# Patient Record
Sex: MALE | Race: WHITE | NOT HISPANIC OR LATINO | Employment: OTHER | URBAN - METROPOLITAN AREA
[De-identification: names, ages, dates, MRNs, and addresses within clinical notes are randomized per-mention and may not be internally consistent; named-entity substitution may affect disease eponyms.]

---

## 2017-01-05 ENCOUNTER — APPOINTMENT (OUTPATIENT)
Dept: LAB | Facility: HOSPITAL | Age: 82
End: 2017-01-05
Payer: MEDICARE

## 2017-01-05 DIAGNOSIS — E11.9 DIABETES MELLITUS WITH NO COMPLICATION (HCC): ICD-10-CM

## 2017-01-05 LAB
ANION GAP SERPL CALCULATED.3IONS-SCNC: 8 MMOL/L (ref 4–13)
BUN SERPL-MCNC: 26 MG/DL (ref 5–25)
CALCIUM SERPL-MCNC: 9.2 MG/DL (ref 8.3–10.1)
CHLORIDE SERPL-SCNC: 107 MMOL/L (ref 100–108)
CO2 SERPL-SCNC: 28 MMOL/L (ref 21–32)
CREAT SERPL-MCNC: 1.31 MG/DL (ref 0.6–1.3)
GFR SERPL CREATININE-BSD FRML MDRD: 52.4 ML/MIN/1.73SQ M
GLUCOSE SERPL-MCNC: 156 MG/DL (ref 65–140)
POTASSIUM SERPL-SCNC: 4 MMOL/L (ref 3.5–5.3)
SODIUM SERPL-SCNC: 143 MMOL/L (ref 136–145)

## 2017-01-05 PROCEDURE — 36415 COLL VENOUS BLD VENIPUNCTURE: CPT

## 2017-01-05 PROCEDURE — 80048 BASIC METABOLIC PNL TOTAL CA: CPT

## 2017-05-19 ENCOUNTER — LAB REQUISITION (OUTPATIENT)
Dept: LAB | Facility: HOSPITAL | Age: 82
End: 2017-05-19
Payer: MEDICARE

## 2017-05-19 DIAGNOSIS — E11.9 TYPE 2 DIABETES MELLITUS WITHOUT COMPLICATIONS (HCC): ICD-10-CM

## 2017-05-19 DIAGNOSIS — R53.83 OTHER FATIGUE: ICD-10-CM

## 2017-05-19 DIAGNOSIS — L50.9 URTICARIA: ICD-10-CM

## 2017-05-19 LAB
ALBUMIN SERPL BCP-MCNC: 3.7 G/DL (ref 3.5–5)
ALP SERPL-CCNC: 80 U/L (ref 46–116)
ALT SERPL W P-5'-P-CCNC: 19 U/L (ref 12–78)
ANION GAP SERPL CALCULATED.3IONS-SCNC: 10 MMOL/L (ref 4–13)
AST SERPL W P-5'-P-CCNC: 12 U/L (ref 5–45)
BILIRUB SERPL-MCNC: 0.54 MG/DL (ref 0.2–1)
BUN SERPL-MCNC: 31 MG/DL (ref 5–25)
CALCIUM SERPL-MCNC: 8.9 MG/DL (ref 8.3–10.1)
CHLORIDE SERPL-SCNC: 103 MMOL/L (ref 100–108)
CO2 SERPL-SCNC: 27 MMOL/L (ref 21–32)
CREAT SERPL-MCNC: 1.43 MG/DL (ref 0.6–1.3)
ERYTHROCYTE [DISTWIDTH] IN BLOOD BY AUTOMATED COUNT: 12.1 % (ref 11.6–15.1)
EST. AVERAGE GLUCOSE BLD GHB EST-MCNC: 203 MG/DL
GFR SERPL CREATININE-BSD FRML MDRD: 47.2 ML/MIN/1.73SQ M
GLUCOSE P FAST SERPL-MCNC: 303 MG/DL (ref 65–99)
HBA1C MFR BLD: 8.7 % (ref 4.2–6.3)
HCT VFR BLD AUTO: 38.3 % (ref 36.5–49.3)
HGB BLD-MCNC: 13 G/DL (ref 12–17)
MCH RBC QN AUTO: 29.9 PG (ref 26.8–34.3)
MCHC RBC AUTO-ENTMCNC: 33.9 G/DL (ref 31.4–37.4)
MCV RBC AUTO: 88 FL (ref 82–98)
PLATELET # BLD AUTO: 142 THOUSANDS/UL (ref 149–390)
PMV BLD AUTO: 10.6 FL (ref 8.9–12.7)
POTASSIUM SERPL-SCNC: 4.9 MMOL/L (ref 3.5–5.3)
PROT SERPL-MCNC: 6.9 G/DL (ref 6.4–8.2)
RBC # BLD AUTO: 4.35 MILLION/UL (ref 3.88–5.62)
SODIUM SERPL-SCNC: 140 MMOL/L (ref 136–145)
TSH SERPL DL<=0.05 MIU/L-ACNC: 1.86 UIU/ML (ref 0.36–3.74)
WBC # BLD AUTO: 4.58 THOUSAND/UL (ref 4.31–10.16)

## 2017-05-19 PROCEDURE — 84443 ASSAY THYROID STIM HORMONE: CPT | Performed by: INTERNAL MEDICINE

## 2017-05-19 PROCEDURE — 80053 COMPREHEN METABOLIC PANEL: CPT | Performed by: INTERNAL MEDICINE

## 2017-05-19 PROCEDURE — 83036 HEMOGLOBIN GLYCOSYLATED A1C: CPT | Performed by: INTERNAL MEDICINE

## 2017-05-19 PROCEDURE — 85027 COMPLETE CBC AUTOMATED: CPT | Performed by: INTERNAL MEDICINE

## 2017-07-17 ENCOUNTER — TRANSCRIBE ORDERS (OUTPATIENT)
Dept: LAB | Facility: CLINIC | Age: 82
End: 2017-07-17

## 2017-07-17 ENCOUNTER — APPOINTMENT (OUTPATIENT)
Dept: LAB | Facility: CLINIC | Age: 82
End: 2017-07-17
Payer: MEDICARE

## 2017-07-17 DIAGNOSIS — Z00.00 ROUTINE GENERAL MEDICAL EXAMINATION AT A HEALTH CARE FACILITY: ICD-10-CM

## 2017-07-17 DIAGNOSIS — Z00.00 ROUTINE GENERAL MEDICAL EXAMINATION AT A HEALTH CARE FACILITY: Primary | ICD-10-CM

## 2017-07-17 LAB
ANION GAP SERPL CALCULATED.3IONS-SCNC: 7 MMOL/L (ref 4–13)
BUN SERPL-MCNC: 22 MG/DL (ref 5–25)
CALCIUM SERPL-MCNC: 8.9 MG/DL (ref 8.3–10.1)
CHLORIDE SERPL-SCNC: 100 MMOL/L (ref 100–108)
CO2 SERPL-SCNC: 29 MMOL/L (ref 21–32)
CREAT SERPL-MCNC: 1.35 MG/DL (ref 0.6–1.3)
GFR SERPL CREATININE-BSD FRML MDRD: 50.5 ML/MIN/1.73SQ M
GLUCOSE P FAST SERPL-MCNC: 375 MG/DL (ref 65–99)
POTASSIUM SERPL-SCNC: 5 MMOL/L (ref 3.5–5.3)
SODIUM SERPL-SCNC: 136 MMOL/L (ref 136–145)

## 2017-07-17 PROCEDURE — 36415 COLL VENOUS BLD VENIPUNCTURE: CPT

## 2017-07-17 PROCEDURE — 80048 BASIC METABOLIC PNL TOTAL CA: CPT

## 2017-07-18 ENCOUNTER — LAB REQUISITION (OUTPATIENT)
Dept: LAB | Facility: HOSPITAL | Age: 82
End: 2017-07-18
Payer: MEDICARE

## 2017-07-18 DIAGNOSIS — E11.65 TYPE 2 DIABETES MELLITUS WITH HYPERGLYCEMIA (HCC): ICD-10-CM

## 2017-07-18 LAB — GLUCOSE P FAST SERPL-MCNC: 214 MG/DL (ref 65–99)

## 2017-07-18 PROCEDURE — 82947 ASSAY GLUCOSE BLOOD QUANT: CPT | Performed by: INTERNAL MEDICINE

## 2017-08-21 ENCOUNTER — APPOINTMENT (OUTPATIENT)
Dept: LAB | Facility: HOSPITAL | Age: 82
End: 2017-08-21
Payer: MEDICARE

## 2017-08-21 ENCOUNTER — TRANSCRIBE ORDERS (OUTPATIENT)
Dept: LAB | Facility: HOSPITAL | Age: 82
End: 2017-08-21

## 2017-08-21 DIAGNOSIS — IMO0001 UNCONTROLLED DIABETES MELLITUS TYPE 2 WITHOUT COMPLICATIONS, UNSPECIFIED LONG TERM INSULIN USE STATUS: ICD-10-CM

## 2017-08-21 DIAGNOSIS — IMO0001 UNCONTROLLED DIABETES MELLITUS TYPE 2 WITHOUT COMPLICATIONS, UNSPECIFIED LONG TERM INSULIN USE STATUS: Primary | ICD-10-CM

## 2017-08-21 LAB
ANION GAP SERPL CALCULATED.3IONS-SCNC: 6 MMOL/L (ref 4–13)
BUN SERPL-MCNC: 23 MG/DL (ref 5–25)
CALCIUM SERPL-MCNC: 8.8 MG/DL (ref 8.3–10.1)
CHLORIDE SERPL-SCNC: 100 MMOL/L (ref 100–108)
CO2 SERPL-SCNC: 31 MMOL/L (ref 21–32)
CREAT SERPL-MCNC: 1.37 MG/DL (ref 0.6–1.3)
GFR SERPL CREATININE-BSD FRML MDRD: 47 ML/MIN/1.73SQ M
GLUCOSE SERPL-MCNC: 398 MG/DL (ref 65–140)
POTASSIUM SERPL-SCNC: 5.2 MMOL/L (ref 3.5–5.3)
SODIUM SERPL-SCNC: 137 MMOL/L (ref 136–145)

## 2017-08-21 PROCEDURE — 36415 COLL VENOUS BLD VENIPUNCTURE: CPT

## 2017-08-21 PROCEDURE — 80048 BASIC METABOLIC PNL TOTAL CA: CPT

## 2017-09-18 ENCOUNTER — APPOINTMENT (OUTPATIENT)
Dept: LAB | Facility: CLINIC | Age: 82
End: 2017-09-18
Payer: MEDICARE

## 2017-09-18 ENCOUNTER — TRANSCRIBE ORDERS (OUTPATIENT)
Dept: LAB | Facility: CLINIC | Age: 82
End: 2017-09-18

## 2017-09-18 DIAGNOSIS — E11.8 UNCONTROLLED TYPE 2 DIABETES MELLITUS WITH COMPLICATION, UNSPECIFIED LONG TERM INSULIN USE STATUS: ICD-10-CM

## 2017-09-18 DIAGNOSIS — E13.8 DIABETES MELLITUS OF OTHER TYPE WITH COMPLICATION, UNSPECIFIED LONG TERM INSULIN USE STATUS: ICD-10-CM

## 2017-09-18 DIAGNOSIS — I10 ESSENTIAL HYPERTENSION, MALIGNANT: ICD-10-CM

## 2017-09-18 DIAGNOSIS — I10 ESSENTIAL HYPERTENSION, MALIGNANT: Primary | ICD-10-CM

## 2017-09-18 DIAGNOSIS — E11.65 UNCONTROLLED TYPE 2 DIABETES MELLITUS WITH COMPLICATION, UNSPECIFIED LONG TERM INSULIN USE STATUS: ICD-10-CM

## 2017-09-18 LAB
ANION GAP SERPL CALCULATED.3IONS-SCNC: 7 MMOL/L (ref 4–13)
BUN SERPL-MCNC: 24 MG/DL (ref 5–25)
CALCIUM SERPL-MCNC: 9.2 MG/DL (ref 8.3–10.1)
CHLORIDE SERPL-SCNC: 105 MMOL/L (ref 100–108)
CO2 SERPL-SCNC: 29 MMOL/L (ref 21–32)
CREAT SERPL-MCNC: 1.37 MG/DL (ref 0.6–1.3)
GFR SERPL CREATININE-BSD FRML MDRD: 47 ML/MIN/1.73SQ M
GLUCOSE SERPL-MCNC: 214 MG/DL (ref 65–140)
POTASSIUM SERPL-SCNC: 4.4 MMOL/L (ref 3.5–5.3)
SODIUM SERPL-SCNC: 141 MMOL/L (ref 136–145)

## 2017-09-18 PROCEDURE — 80048 BASIC METABOLIC PNL TOTAL CA: CPT

## 2017-09-18 PROCEDURE — 36415 COLL VENOUS BLD VENIPUNCTURE: CPT

## 2017-10-12 ENCOUNTER — APPOINTMENT (OUTPATIENT)
Dept: LAB | Facility: CLINIC | Age: 82
End: 2017-10-12
Payer: MEDICARE

## 2017-10-12 DIAGNOSIS — E11.65 UNCONTROLLED TYPE 2 DIABETES MELLITUS WITH COMPLICATION, UNSPECIFIED LONG TERM INSULIN USE STATUS: ICD-10-CM

## 2017-10-12 DIAGNOSIS — E11.8 UNCONTROLLED TYPE 2 DIABETES MELLITUS WITH COMPLICATION, UNSPECIFIED LONG TERM INSULIN USE STATUS: ICD-10-CM

## 2017-10-12 LAB
ANION GAP SERPL CALCULATED.3IONS-SCNC: 4 MMOL/L (ref 4–13)
BUN SERPL-MCNC: 27 MG/DL (ref 5–25)
CALCIUM SERPL-MCNC: 9 MG/DL (ref 8.3–10.1)
CHLORIDE SERPL-SCNC: 101 MMOL/L (ref 100–108)
CO2 SERPL-SCNC: 31 MMOL/L (ref 21–32)
CREAT SERPL-MCNC: 1.33 MG/DL (ref 0.6–1.3)
GFR SERPL CREATININE-BSD FRML MDRD: 49 ML/MIN/1.73SQ M
GLUCOSE SERPL-MCNC: 305 MG/DL (ref 65–140)
POTASSIUM SERPL-SCNC: 4.4 MMOL/L (ref 3.5–5.3)
SODIUM SERPL-SCNC: 136 MMOL/L (ref 136–145)

## 2017-10-12 PROCEDURE — 80048 BASIC METABOLIC PNL TOTAL CA: CPT

## 2017-10-12 PROCEDURE — 36415 COLL VENOUS BLD VENIPUNCTURE: CPT

## 2017-12-11 ENCOUNTER — TRANSCRIBE ORDERS (OUTPATIENT)
Dept: LAB | Facility: CLINIC | Age: 82
End: 2017-12-11

## 2017-12-11 ENCOUNTER — APPOINTMENT (OUTPATIENT)
Dept: LAB | Facility: CLINIC | Age: 82
End: 2017-12-11
Payer: MEDICARE

## 2017-12-11 DIAGNOSIS — E11.319: Primary | ICD-10-CM

## 2017-12-11 DIAGNOSIS — E11.65: Primary | ICD-10-CM

## 2017-12-11 LAB
ANION GAP SERPL CALCULATED.3IONS-SCNC: 8 MMOL/L (ref 4–13)
BUN SERPL-MCNC: 29 MG/DL (ref 5–25)
CALCIUM SERPL-MCNC: 9.1 MG/DL (ref 8.3–10.1)
CHLORIDE SERPL-SCNC: 105 MMOL/L (ref 100–108)
CO2 SERPL-SCNC: 28 MMOL/L (ref 21–32)
CREAT SERPL-MCNC: 1.4 MG/DL (ref 0.6–1.3)
EST. AVERAGE GLUCOSE BLD GHB EST-MCNC: 252 MG/DL
GFR SERPL CREATININE-BSD FRML MDRD: 46 ML/MIN/1.73SQ M
GLUCOSE SERPL-MCNC: 138 MG/DL (ref 65–140)
HBA1C MFR BLD: 10.4 % (ref 4.2–6.3)
POTASSIUM SERPL-SCNC: 4.3 MMOL/L (ref 3.5–5.3)
SODIUM SERPL-SCNC: 141 MMOL/L (ref 136–145)

## 2017-12-11 PROCEDURE — 83036 HEMOGLOBIN GLYCOSYLATED A1C: CPT

## 2017-12-11 PROCEDURE — 80048 BASIC METABOLIC PNL TOTAL CA: CPT

## 2017-12-11 PROCEDURE — 36415 COLL VENOUS BLD VENIPUNCTURE: CPT

## 2018-04-03 ENCOUNTER — APPOINTMENT (OUTPATIENT)
Dept: LAB | Facility: CLINIC | Age: 83
End: 2018-04-03
Payer: MEDICARE

## 2018-04-03 ENCOUNTER — TRANSCRIBE ORDERS (OUTPATIENT)
Dept: LAB | Facility: CLINIC | Age: 83
End: 2018-04-03

## 2018-04-03 DIAGNOSIS — E13.8 DIABETES MELLITUS OF OTHER TYPE WITH COMPLICATION, UNSPECIFIED LONG TERM INSULIN USE STATUS: ICD-10-CM

## 2018-04-03 DIAGNOSIS — E13.8 DIABETES MELLITUS OF OTHER TYPE WITH COMPLICATION, UNSPECIFIED LONG TERM INSULIN USE STATUS: Primary | ICD-10-CM

## 2018-04-03 LAB
ANION GAP SERPL CALCULATED.3IONS-SCNC: 0 MMOL/L (ref 4–13)
BUN SERPL-MCNC: 32 MG/DL (ref 5–25)
CALCIUM SERPL-MCNC: 9.4 MG/DL (ref 8.3–10.1)
CHLORIDE SERPL-SCNC: 106 MMOL/L (ref 100–108)
CO2 SERPL-SCNC: 33 MMOL/L (ref 21–32)
CREAT SERPL-MCNC: 1.4 MG/DL (ref 0.6–1.3)
EST. AVERAGE GLUCOSE BLD GHB EST-MCNC: 229 MG/DL
GFR SERPL CREATININE-BSD FRML MDRD: 46 ML/MIN/1.73SQ M
GLUCOSE SERPL-MCNC: 254 MG/DL (ref 65–140)
HBA1C MFR BLD: 9.6 % (ref 4.2–6.3)
POTASSIUM SERPL-SCNC: 5.1 MMOL/L (ref 3.5–5.3)
SODIUM SERPL-SCNC: 139 MMOL/L (ref 136–145)

## 2018-04-03 PROCEDURE — 36415 COLL VENOUS BLD VENIPUNCTURE: CPT

## 2018-04-03 PROCEDURE — 80048 BASIC METABOLIC PNL TOTAL CA: CPT

## 2018-04-03 PROCEDURE — 83036 HEMOGLOBIN GLYCOSYLATED A1C: CPT

## 2018-08-14 ENCOUNTER — LAB REQUISITION (OUTPATIENT)
Dept: LAB | Facility: HOSPITAL | Age: 83
End: 2018-08-14
Payer: MEDICARE

## 2018-08-14 DIAGNOSIS — E11.65 TYPE 2 DIABETES MELLITUS WITH HYPERGLYCEMIA (HCC): ICD-10-CM

## 2018-08-14 LAB
ANION GAP SERPL CALCULATED.3IONS-SCNC: 6 MMOL/L (ref 4–13)
BUN SERPL-MCNC: 25 MG/DL (ref 5–25)
CALCIUM SERPL-MCNC: 8.9 MG/DL (ref 8.3–10.1)
CHLORIDE SERPL-SCNC: 101 MMOL/L (ref 100–108)
CO2 SERPL-SCNC: 29 MMOL/L (ref 21–32)
CREAT SERPL-MCNC: 1.28 MG/DL (ref 0.6–1.3)
EST. AVERAGE GLUCOSE BLD GHB EST-MCNC: 246 MG/DL
GFR SERPL CREATININE-BSD FRML MDRD: 51 ML/MIN/1.73SQ M
GLUCOSE SERPL-MCNC: 225 MG/DL (ref 65–140)
HBA1C MFR BLD: 10.2 % (ref 4.2–6.3)
POTASSIUM SERPL-SCNC: 4.4 MMOL/L (ref 3.5–5.3)
SODIUM SERPL-SCNC: 136 MMOL/L (ref 136–145)

## 2018-08-14 PROCEDURE — 83036 HEMOGLOBIN GLYCOSYLATED A1C: CPT | Performed by: INTERNAL MEDICINE

## 2018-08-14 PROCEDURE — 80048 BASIC METABOLIC PNL TOTAL CA: CPT | Performed by: INTERNAL MEDICINE

## 2019-03-04 ENCOUNTER — LAB REQUISITION (OUTPATIENT)
Dept: LAB | Facility: HOSPITAL | Age: 84
End: 2019-03-04
Payer: MEDICARE

## 2019-03-04 DIAGNOSIS — E11.65 TYPE 2 DIABETES MELLITUS WITH HYPERGLYCEMIA (HCC): ICD-10-CM

## 2019-03-04 DIAGNOSIS — E78.5 HYPERLIPIDEMIA: ICD-10-CM

## 2019-03-04 LAB
ALBUMIN SERPL BCP-MCNC: 3.8 G/DL (ref 3.5–5)
ALP SERPL-CCNC: 87 U/L (ref 46–116)
ALT SERPL W P-5'-P-CCNC: 23 U/L (ref 12–78)
ANION GAP SERPL CALCULATED.3IONS-SCNC: 7 MMOL/L (ref 4–13)
AST SERPL W P-5'-P-CCNC: 19 U/L (ref 5–45)
BILIRUB SERPL-MCNC: 0.59 MG/DL (ref 0.2–1)
BUN SERPL-MCNC: 22 MG/DL (ref 5–25)
CALCIUM SERPL-MCNC: 9 MG/DL (ref 8.3–10.1)
CHLORIDE SERPL-SCNC: 102 MMOL/L (ref 100–108)
CO2 SERPL-SCNC: 27 MMOL/L (ref 21–32)
CREAT SERPL-MCNC: 1.16 MG/DL (ref 0.6–1.3)
EST. AVERAGE GLUCOSE BLD GHB EST-MCNC: 249 MG/DL
GFR SERPL CREATININE-BSD FRML MDRD: 58 ML/MIN/1.73SQ M
GLUCOSE SERPL-MCNC: 157 MG/DL (ref 65–140)
HBA1C MFR BLD: 10.3 % (ref 4.2–6.3)
LDLC SERPL DIRECT ASSAY-MCNC: 131 MG/DL (ref 0–100)
POTASSIUM SERPL-SCNC: 4.8 MMOL/L (ref 3.5–5.3)
PROT SERPL-MCNC: 7 G/DL (ref 6.4–8.2)
SODIUM SERPL-SCNC: 136 MMOL/L (ref 136–145)

## 2019-03-04 PROCEDURE — 83721 ASSAY OF BLOOD LIPOPROTEIN: CPT | Performed by: INTERNAL MEDICINE

## 2019-03-04 PROCEDURE — 80053 COMPREHEN METABOLIC PANEL: CPT | Performed by: INTERNAL MEDICINE

## 2019-03-04 PROCEDURE — 83036 HEMOGLOBIN GLYCOSYLATED A1C: CPT | Performed by: INTERNAL MEDICINE

## 2019-04-06 ENCOUNTER — HOSPITAL ENCOUNTER (INPATIENT)
Facility: HOSPITAL | Age: 84
LOS: 3 days | Discharge: HOME HEALTH | DRG: 309 | End: 2019-04-09
Attending: EMERGENCY MEDICINE | Admitting: INTERNAL MEDICINE
Payer: MEDICARE

## 2019-04-06 ENCOUNTER — APPOINTMENT (EMERGENCY)
Dept: RADIOLOGY | Facility: HOSPITAL | Age: 84
DRG: 309 | End: 2019-04-06
Payer: MEDICARE

## 2019-04-06 DIAGNOSIS — E11.65 TYPE 2 DIABETES MELLITUS WITH HYPERGLYCEMIA, WITHOUT LONG-TERM CURRENT USE OF INSULIN (HCC): ICD-10-CM

## 2019-04-06 DIAGNOSIS — D72.829 LEUKOCYTOSIS: ICD-10-CM

## 2019-04-06 DIAGNOSIS — I48.91 ATRIAL FIBRILLATION WITH RVR (HCC): Primary | ICD-10-CM

## 2019-04-06 DIAGNOSIS — N17.9 ACUTE KIDNEY INJURY (HCC): ICD-10-CM

## 2019-04-06 DIAGNOSIS — E46 MALNUTRITION (HCC): ICD-10-CM

## 2019-04-06 PROBLEM — F41.8 DEPRESSION WITH ANXIETY: Status: ACTIVE | Noted: 2019-04-06

## 2019-04-06 LAB
ALBUMIN SERPL BCP-MCNC: 3.5 G/DL (ref 3.5–5)
ALP SERPL-CCNC: 89 U/L (ref 46–116)
ALT SERPL W P-5'-P-CCNC: 22 U/L (ref 12–78)
ANION GAP SERPL CALCULATED.3IONS-SCNC: 16 MMOL/L (ref 4–13)
APTT PPP: 30 SECONDS (ref 26–38)
AST SERPL W P-5'-P-CCNC: 15 U/L (ref 5–45)
BACTERIA UR QL AUTO: ABNORMAL /HPF
BASOPHILS # BLD AUTO: 0.05 THOUSANDS/ΜL (ref 0–0.1)
BASOPHILS NFR BLD AUTO: 0 % (ref 0–1)
BILIRUB SERPL-MCNC: 0.96 MG/DL (ref 0.2–1)
BILIRUB UR QL STRIP: NEGATIVE
BUN SERPL-MCNC: 37 MG/DL (ref 5–25)
CALCIUM SERPL-MCNC: 9.5 MG/DL (ref 8.3–10.1)
CHLORIDE SERPL-SCNC: 97 MMOL/L (ref 100–108)
CLARITY UR: CLEAR
CO2 SERPL-SCNC: 25 MMOL/L (ref 21–32)
COLOR UR: YELLOW
COLOR, POC: YELLOW
CREAT SERPL-MCNC: 1.58 MG/DL (ref 0.6–1.3)
EOSINOPHIL # BLD AUTO: 0.01 THOUSAND/ΜL (ref 0–0.61)
EOSINOPHIL NFR BLD AUTO: 0 % (ref 0–6)
ERYTHROCYTE [DISTWIDTH] IN BLOOD BY AUTOMATED COUNT: 11.9 % (ref 11.6–15.1)
GFR SERPL CREATININE-BSD FRML MDRD: 39 ML/MIN/1.73SQ M
GLUCOSE SERPL-MCNC: 334 MG/DL (ref 65–140)
GLUCOSE SERPL-MCNC: 487 MG/DL (ref 65–140)
GLUCOSE UR STRIP-MCNC: ABNORMAL MG/DL
HCT VFR BLD AUTO: 48.2 % (ref 36.5–49.3)
HGB BLD-MCNC: 15.9 G/DL (ref 12–17)
HGB UR QL STRIP.AUTO: ABNORMAL
IMM GRANULOCYTES # BLD AUTO: 0.14 THOUSAND/UL (ref 0–0.2)
IMM GRANULOCYTES NFR BLD AUTO: 1 % (ref 0–2)
INR PPP: 1 (ref 0.86–1.17)
KETONES UR STRIP-MCNC: ABNORMAL MG/DL
LACTATE SERPL-SCNC: 0.5 MMOL/L (ref 0.5–2)
LEUKOCYTE ESTERASE UR QL STRIP: NEGATIVE
LYMPHOCYTES # BLD AUTO: 1.42 THOUSANDS/ΜL (ref 0.6–4.47)
LYMPHOCYTES NFR BLD AUTO: 8 % (ref 14–44)
MAGNESIUM SERPL-MCNC: 2 MG/DL (ref 1.6–2.6)
MCH RBC QN AUTO: 30.2 PG (ref 26.8–34.3)
MCHC RBC AUTO-ENTMCNC: 33 G/DL (ref 31.4–37.4)
MCV RBC AUTO: 92 FL (ref 82–98)
MONOCYTES # BLD AUTO: 0.78 THOUSAND/ΜL (ref 0.17–1.22)
MONOCYTES NFR BLD AUTO: 4 % (ref 4–12)
NEUTROPHILS # BLD AUTO: 16.3 THOUSANDS/ΜL (ref 1.85–7.62)
NEUTS SEG NFR BLD AUTO: 87 % (ref 43–75)
NITRITE UR QL STRIP: NEGATIVE
NON-SQ EPI CELLS URNS QL MICRO: ABNORMAL /HPF
NRBC BLD AUTO-RTO: 0 /100 WBCS
PH UR STRIP.AUTO: 5 [PH] (ref 4.5–8)
PLATELET # BLD AUTO: 201 THOUSANDS/UL (ref 149–390)
PMV BLD AUTO: 10.4 FL (ref 8.9–12.7)
POTASSIUM SERPL-SCNC: 5.1 MMOL/L (ref 3.5–5.3)
PROCALCITONIN SERPL-MCNC: 0.09 NG/ML
PROT SERPL-MCNC: 7.7 G/DL (ref 6.4–8.2)
PROT UR STRIP-MCNC: NEGATIVE MG/DL
PROTHROMBIN TIME: 13.3 SECONDS (ref 11.8–14.2)
RBC # BLD AUTO: 5.27 MILLION/UL (ref 3.88–5.62)
RBC #/AREA URNS AUTO: ABNORMAL /HPF
SODIUM SERPL-SCNC: 138 MMOL/L (ref 136–145)
SP GR UR STRIP.AUTO: <=1.005 (ref 1–1.03)
TROPONIN I SERPL-MCNC: 0.04 NG/ML
TSH SERPL DL<=0.05 MIU/L-ACNC: 2.85 UIU/ML (ref 0.36–3.74)
UROBILINOGEN UR QL STRIP.AUTO: 0.2 E.U./DL
WBC # BLD AUTO: 18.7 THOUSAND/UL (ref 4.31–10.16)
WBC #/AREA URNS AUTO: ABNORMAL /HPF

## 2019-04-06 PROCEDURE — 83735 ASSAY OF MAGNESIUM: CPT | Performed by: EMERGENCY MEDICINE

## 2019-04-06 PROCEDURE — 85730 THROMBOPLASTIN TIME PARTIAL: CPT | Performed by: EMERGENCY MEDICINE

## 2019-04-06 PROCEDURE — 93005 ELECTROCARDIOGRAM TRACING: CPT

## 2019-04-06 PROCEDURE — 71046 X-RAY EXAM CHEST 2 VIEWS: CPT

## 2019-04-06 PROCEDURE — 84484 ASSAY OF TROPONIN QUANT: CPT | Performed by: EMERGENCY MEDICINE

## 2019-04-06 PROCEDURE — 99223 1ST HOSP IP/OBS HIGH 75: CPT | Performed by: PHYSICIAN ASSISTANT

## 2019-04-06 PROCEDURE — 84443 ASSAY THYROID STIM HORMONE: CPT | Performed by: EMERGENCY MEDICINE

## 2019-04-06 PROCEDURE — 96365 THER/PROPH/DIAG IV INF INIT: CPT

## 2019-04-06 PROCEDURE — 96376 TX/PRO/DX INJ SAME DRUG ADON: CPT

## 2019-04-06 PROCEDURE — 99285 EMERGENCY DEPT VISIT HI MDM: CPT

## 2019-04-06 PROCEDURE — 81001 URINALYSIS AUTO W/SCOPE: CPT

## 2019-04-06 PROCEDURE — 80053 COMPREHEN METABOLIC PANEL: CPT | Performed by: EMERGENCY MEDICINE

## 2019-04-06 PROCEDURE — 99285 EMERGENCY DEPT VISIT HI MDM: CPT | Performed by: EMERGENCY MEDICINE

## 2019-04-06 PROCEDURE — 87040 BLOOD CULTURE FOR BACTERIA: CPT | Performed by: EMERGENCY MEDICINE

## 2019-04-06 PROCEDURE — 36415 COLL VENOUS BLD VENIPUNCTURE: CPT | Performed by: EMERGENCY MEDICINE

## 2019-04-06 PROCEDURE — 84145 PROCALCITONIN (PCT): CPT | Performed by: EMERGENCY MEDICINE

## 2019-04-06 PROCEDURE — 83605 ASSAY OF LACTIC ACID: CPT | Performed by: EMERGENCY MEDICINE

## 2019-04-06 PROCEDURE — 82948 REAGENT STRIP/BLOOD GLUCOSE: CPT

## 2019-04-06 PROCEDURE — 85610 PROTHROMBIN TIME: CPT | Performed by: EMERGENCY MEDICINE

## 2019-04-06 PROCEDURE — 85025 COMPLETE CBC W/AUTO DIFF WBC: CPT | Performed by: EMERGENCY MEDICINE

## 2019-04-06 RX ORDER — GLIMEPIRIDE 4 MG/1
4 TABLET ORAL
COMMUNITY
End: 2020-09-08 | Stop reason: HOSPADM

## 2019-04-06 RX ORDER — DILTIAZEM HYDROCHLORIDE 5 MG/ML
INJECTION INTRAVENOUS
Status: DISCONTINUED
Start: 2019-04-06 | End: 2019-04-06 | Stop reason: WASHOUT

## 2019-04-06 RX ORDER — ONDANSETRON 2 MG/ML
4 INJECTION INTRAMUSCULAR; INTRAVENOUS EVERY 6 HOURS PRN
Status: DISCONTINUED | OUTPATIENT
Start: 2019-04-06 | End: 2019-04-07

## 2019-04-06 RX ORDER — GLYBURIDE-METFORMIN HYDROCHLORIDE 5; 500 MG/1; MG/1
TABLET ORAL
COMMUNITY
End: 2019-04-06

## 2019-04-06 RX ORDER — ACETAMINOPHEN 325 MG/1
650 TABLET ORAL EVERY 6 HOURS PRN
Status: DISCONTINUED | OUTPATIENT
Start: 2019-04-06 | End: 2019-04-09 | Stop reason: HOSPADM

## 2019-04-06 RX ORDER — DILTIAZEM HYDROCHLORIDE 5 MG/ML
10 INJECTION INTRAVENOUS ONCE
Status: COMPLETED | OUTPATIENT
Start: 2019-04-06 | End: 2019-04-06

## 2019-04-06 RX ORDER — HEPARIN SODIUM 5000 [USP'U]/ML
5000 INJECTION, SOLUTION INTRAVENOUS; SUBCUTANEOUS EVERY 8 HOURS SCHEDULED
Status: DISCONTINUED | OUTPATIENT
Start: 2019-04-06 | End: 2019-04-07

## 2019-04-06 RX ORDER — DILTIAZEM HYDROCHLORIDE 5 MG/ML
15 INJECTION INTRAVENOUS ONCE
Status: DISCONTINUED | OUTPATIENT
Start: 2019-04-06 | End: 2019-04-06

## 2019-04-06 RX ORDER — SODIUM CHLORIDE 9 MG/ML
100 INJECTION, SOLUTION INTRAVENOUS CONTINUOUS
Status: DISCONTINUED | OUTPATIENT
Start: 2019-04-06 | End: 2019-04-07

## 2019-04-06 RX ORDER — POLYETHYLENE GLYCOL 3350 17 G/17G
17 POWDER, FOR SOLUTION ORAL DAILY PRN
Status: DISCONTINUED | OUTPATIENT
Start: 2019-04-06 | End: 2019-04-09 | Stop reason: HOSPADM

## 2019-04-06 RX ORDER — MAGNESIUM HYDROXIDE/ALUMINUM HYDROXICE/SIMETHICONE 120; 1200; 1200 MG/30ML; MG/30ML; MG/30ML
30 SUSPENSION ORAL EVERY 6 HOURS PRN
Status: DISCONTINUED | OUTPATIENT
Start: 2019-04-06 | End: 2019-04-09 | Stop reason: HOSPADM

## 2019-04-06 RX ORDER — HYDROXYZINE HYDROCHLORIDE 25 MG/1
25 TABLET, FILM COATED ORAL EVERY 6 HOURS PRN
Status: DISCONTINUED | OUTPATIENT
Start: 2019-04-06 | End: 2019-04-07

## 2019-04-06 RX ORDER — HYDROXYZINE HYDROCHLORIDE 25 MG/1
25 TABLET, FILM COATED ORAL EVERY 6 HOURS PRN
COMMUNITY
End: 2020-09-08 | Stop reason: HOSPADM

## 2019-04-06 RX ADMIN — INSULIN LISPRO 5 UNITS: 100 INJECTION, SOLUTION INTRAVENOUS; SUBCUTANEOUS at 22:52

## 2019-04-06 RX ADMIN — DILTIAZEM HYDROCHLORIDE 10 MG: 5 INJECTION INTRAVENOUS at 17:18

## 2019-04-06 RX ADMIN — HEPARIN SODIUM 5000 UNITS: 5000 INJECTION INTRAVENOUS; SUBCUTANEOUS at 21:23

## 2019-04-06 RX ADMIN — SODIUM CHLORIDE 1000 ML: 0.9 INJECTION, SOLUTION INTRAVENOUS at 17:25

## 2019-04-06 RX ADMIN — DILTIAZEM HYDROCHLORIDE 5 MG/HR: 5 INJECTION INTRAVENOUS at 17:48

## 2019-04-06 RX ADMIN — SODIUM CHLORIDE 100 ML/HR: 0.9 INJECTION, SOLUTION INTRAVENOUS at 21:25

## 2019-04-06 RX ADMIN — DILTIAZEM HYDROCHLORIDE 10 MG: 5 INJECTION INTRAVENOUS at 17:12

## 2019-04-07 LAB
ANION GAP SERPL CALCULATED.3IONS-SCNC: 6 MMOL/L (ref 4–13)
BUN SERPL-MCNC: 33 MG/DL (ref 5–25)
CALCIUM SERPL-MCNC: 8.4 MG/DL (ref 8.3–10.1)
CHLORIDE SERPL-SCNC: 106 MMOL/L (ref 100–108)
CO2 SERPL-SCNC: 27 MMOL/L (ref 21–32)
CREAT SERPL-MCNC: 1.21 MG/DL (ref 0.6–1.3)
ERYTHROCYTE [DISTWIDTH] IN BLOOD BY AUTOMATED COUNT: 12 % (ref 11.6–15.1)
EST. AVERAGE GLUCOSE BLD GHB EST-MCNC: 275 MG/DL
GFR SERPL CREATININE-BSD FRML MDRD: 54 ML/MIN/1.73SQ M
GLUCOSE SERPL-MCNC: 114 MG/DL (ref 65–140)
GLUCOSE SERPL-MCNC: 119 MG/DL (ref 65–140)
GLUCOSE SERPL-MCNC: 124 MG/DL (ref 65–140)
GLUCOSE SERPL-MCNC: 190 MG/DL (ref 65–140)
GLUCOSE SERPL-MCNC: 405 MG/DL (ref 65–140)
HBA1C MFR BLD: 11.2 % (ref 4.2–6.3)
HCT VFR BLD AUTO: 38.2 % (ref 36.5–49.3)
HGB BLD-MCNC: 12.5 G/DL (ref 12–17)
MCH RBC QN AUTO: 30 PG (ref 26.8–34.3)
MCHC RBC AUTO-ENTMCNC: 32.7 G/DL (ref 31.4–37.4)
MCV RBC AUTO: 92 FL (ref 82–98)
PLATELET # BLD AUTO: 160 THOUSANDS/UL (ref 149–390)
PMV BLD AUTO: 9.9 FL (ref 8.9–12.7)
POTASSIUM SERPL-SCNC: 4.1 MMOL/L (ref 3.5–5.3)
RBC # BLD AUTO: 4.16 MILLION/UL (ref 3.88–5.62)
SODIUM SERPL-SCNC: 139 MMOL/L (ref 136–145)
WBC # BLD AUTO: 11.55 THOUSAND/UL (ref 4.31–10.16)

## 2019-04-07 PROCEDURE — 85027 COMPLETE CBC AUTOMATED: CPT | Performed by: PHYSICIAN ASSISTANT

## 2019-04-07 PROCEDURE — 99232 SBSQ HOSP IP/OBS MODERATE 35: CPT | Performed by: INTERNAL MEDICINE

## 2019-04-07 PROCEDURE — 80048 BASIC METABOLIC PNL TOTAL CA: CPT | Performed by: PHYSICIAN ASSISTANT

## 2019-04-07 PROCEDURE — 83036 HEMOGLOBIN GLYCOSYLATED A1C: CPT | Performed by: INTERNAL MEDICINE

## 2019-04-07 PROCEDURE — 82948 REAGENT STRIP/BLOOD GLUCOSE: CPT

## 2019-04-07 RX ORDER — INSULIN GLARGINE 100 [IU]/ML
10 INJECTION, SOLUTION SUBCUTANEOUS ONCE
Status: COMPLETED | OUTPATIENT
Start: 2019-04-07 | End: 2019-04-07

## 2019-04-07 RX ORDER — GLIMEPIRIDE 2 MG/1
4 TABLET ORAL
Status: DISCONTINUED | OUTPATIENT
Start: 2019-04-08 | End: 2019-04-09 | Stop reason: HOSPADM

## 2019-04-07 RX ORDER — METOPROLOL SUCCINATE 50 MG/1
50 TABLET, EXTENDED RELEASE ORAL DAILY
Status: DISCONTINUED | OUTPATIENT
Start: 2019-04-07 | End: 2019-04-09 | Stop reason: HOSPADM

## 2019-04-07 RX ADMIN — METOPROLOL SUCCINATE 50 MG: 50 TABLET, EXTENDED RELEASE ORAL at 09:56

## 2019-04-07 RX ADMIN — INSULIN GLARGINE 10 UNITS: 100 INJECTION, SOLUTION SUBCUTANEOUS at 14:48

## 2019-04-07 RX ADMIN — INSULIN LISPRO 6 UNITS: 100 INJECTION, SOLUTION INTRAVENOUS; SUBCUTANEOUS at 12:30

## 2019-04-07 RX ADMIN — APIXABAN 2.5 MG: 2.5 TABLET, FILM COATED ORAL at 17:07

## 2019-04-07 RX ADMIN — HEPARIN SODIUM 5000 UNITS: 5000 INJECTION INTRAVENOUS; SUBCUTANEOUS at 05:12

## 2019-04-07 RX ADMIN — APIXABAN 2.5 MG: 2.5 TABLET, FILM COATED ORAL at 09:56

## 2019-04-07 RX ADMIN — METFORMIN HYDROCHLORIDE 1000 MG: 500 TABLET ORAL at 17:07

## 2019-04-08 ENCOUNTER — APPOINTMENT (INPATIENT)
Dept: NON INVASIVE DIAGNOSTICS | Facility: HOSPITAL | Age: 84
DRG: 309 | End: 2019-04-08
Payer: MEDICARE

## 2019-04-08 PROBLEM — N18.30 CKD (CHRONIC KIDNEY DISEASE) STAGE 3, GFR 30-59 ML/MIN (HCC): Status: ACTIVE | Noted: 2019-04-08

## 2019-04-08 PROBLEM — N17.9 AKI (ACUTE KIDNEY INJURY) (HCC): Status: RESOLVED | Noted: 2019-04-06 | Resolved: 2019-04-08

## 2019-04-08 LAB
GLUCOSE SERPL-MCNC: 159 MG/DL (ref 65–140)
GLUCOSE SERPL-MCNC: 162 MG/DL (ref 65–140)
GLUCOSE SERPL-MCNC: 180 MG/DL (ref 65–140)
GLUCOSE SERPL-MCNC: 289 MG/DL (ref 65–140)

## 2019-04-08 PROCEDURE — 93306 TTE W/DOPPLER COMPLETE: CPT

## 2019-04-08 PROCEDURE — 82948 REAGENT STRIP/BLOOD GLUCOSE: CPT

## 2019-04-08 PROCEDURE — 99232 SBSQ HOSP IP/OBS MODERATE 35: CPT | Performed by: INTERNAL MEDICINE

## 2019-04-08 RX ORDER — INSULIN GLARGINE 100 [IU]/ML
10 INJECTION, SOLUTION SUBCUTANEOUS
Qty: 300 UNITS | Refills: 0 | Status: SHIPPED | OUTPATIENT
Start: 2019-04-08 | End: 2020-09-08 | Stop reason: HOSPADM

## 2019-04-08 RX ORDER — METOPROLOL SUCCINATE 50 MG/1
50 TABLET, EXTENDED RELEASE ORAL DAILY
Qty: 30 TABLET | Refills: 0 | Status: SHIPPED | OUTPATIENT
Start: 2019-04-09 | End: 2019-04-08

## 2019-04-08 RX ORDER — INSULIN GLARGINE 100 [IU]/ML
10 INJECTION, SOLUTION SUBCUTANEOUS
Qty: 300 UNITS | Refills: 0 | Status: SHIPPED | OUTPATIENT
Start: 2019-04-08 | End: 2019-04-08

## 2019-04-08 RX ORDER — METOPROLOL SUCCINATE 50 MG/1
50 TABLET, EXTENDED RELEASE ORAL DAILY
Qty: 30 TABLET | Refills: 0 | Status: SHIPPED | OUTPATIENT
Start: 2019-04-09 | End: 2019-04-30 | Stop reason: SDUPTHER

## 2019-04-08 RX ORDER — INSULIN GLARGINE 100 [IU]/ML
10 INJECTION, SOLUTION SUBCUTANEOUS
Status: DISCONTINUED | OUTPATIENT
Start: 2019-04-08 | End: 2019-04-08

## 2019-04-08 RX ORDER — INSULIN GLARGINE 100 [IU]/ML
10 INJECTION, SOLUTION SUBCUTANEOUS EVERY MORNING
Status: DISCONTINUED | OUTPATIENT
Start: 2019-04-09 | End: 2019-04-09 | Stop reason: HOSPADM

## 2019-04-08 RX ADMIN — INSULIN LISPRO 2 UNITS: 100 INJECTION, SOLUTION INTRAVENOUS; SUBCUTANEOUS at 09:03

## 2019-04-08 RX ADMIN — INSULIN LISPRO 6 UNITS: 100 INJECTION, SOLUTION INTRAVENOUS; SUBCUTANEOUS at 13:11

## 2019-04-08 RX ADMIN — APIXABAN 2.5 MG: 2.5 TABLET, FILM COATED ORAL at 17:45

## 2019-04-08 RX ADMIN — METOPROLOL SUCCINATE 50 MG: 50 TABLET, EXTENDED RELEASE ORAL at 08:53

## 2019-04-08 RX ADMIN — METFORMIN HYDROCHLORIDE 1000 MG: 500 TABLET ORAL at 08:53

## 2019-04-08 RX ADMIN — INSULIN LISPRO 2 UNITS: 100 INJECTION, SOLUTION INTRAVENOUS; SUBCUTANEOUS at 17:46

## 2019-04-08 RX ADMIN — METFORMIN HYDROCHLORIDE 1000 MG: 500 TABLET ORAL at 17:45

## 2019-04-08 RX ADMIN — GLIMEPIRIDE 4 MG: 2 TABLET ORAL at 08:53

## 2019-04-08 RX ADMIN — APIXABAN 2.5 MG: 2.5 TABLET, FILM COATED ORAL at 08:53

## 2019-04-09 VITALS
OXYGEN SATURATION: 94 % | SYSTOLIC BLOOD PRESSURE: 121 MMHG | BODY MASS INDEX: 17.13 KG/M2 | HEART RATE: 66 BPM | HEIGHT: 67 IN | RESPIRATION RATE: 18 BRPM | TEMPERATURE: 97.7 F | WEIGHT: 109.13 LBS | DIASTOLIC BLOOD PRESSURE: 59 MMHG

## 2019-04-09 PROBLEM — I48.91 ATRIAL FIBRILLATION WITH RVR (HCC): Status: RESOLVED | Noted: 2019-04-06 | Resolved: 2019-04-09

## 2019-04-09 LAB
GLUCOSE SERPL-MCNC: 126 MG/DL (ref 65–140)
GLUCOSE SERPL-MCNC: 299 MG/DL (ref 65–140)

## 2019-04-09 PROCEDURE — 99239 HOSP IP/OBS DSCHRG MGMT >30: CPT | Performed by: PHYSICIAN ASSISTANT

## 2019-04-09 PROCEDURE — 93306 TTE W/DOPPLER COMPLETE: CPT | Performed by: INTERNAL MEDICINE

## 2019-04-09 PROCEDURE — 82948 REAGENT STRIP/BLOOD GLUCOSE: CPT

## 2019-04-09 RX ADMIN — METFORMIN HYDROCHLORIDE 1000 MG: 500 TABLET ORAL at 08:25

## 2019-04-09 RX ADMIN — GLIMEPIRIDE 4 MG: 2 TABLET ORAL at 08:25

## 2019-04-09 RX ADMIN — INSULIN GLARGINE 10 UNITS: 100 INJECTION, SOLUTION SUBCUTANEOUS at 08:26

## 2019-04-09 RX ADMIN — APIXABAN 2.5 MG: 2.5 TABLET, FILM COATED ORAL at 08:25

## 2019-04-09 RX ADMIN — METOPROLOL SUCCINATE 50 MG: 50 TABLET, EXTENDED RELEASE ORAL at 08:25

## 2019-04-10 LAB
P AXIS: 81 DEGREES
QRS AXIS: 87 DEGREES
QRS AXIS: 97 DEGREES
QRSD INTERVAL: 68 MS
QRSD INTERVAL: 70 MS
QT INTERVAL: 300 MS
QT INTERVAL: 304 MS
QTC INTERVAL: 426 MS
QTC INTERVAL: 505 MS
T WAVE AXIS: 46 DEGREES
T WAVE AXIS: 76 DEGREES
VENTRICULAR RATE: 121 BPM
VENTRICULAR RATE: 166 BPM

## 2019-04-10 PROCEDURE — 93010 ELECTROCARDIOGRAM REPORT: CPT | Performed by: INTERNAL MEDICINE

## 2019-04-11 LAB
BACTERIA BLD CULT: NORMAL
BACTERIA BLD CULT: NORMAL

## 2019-04-17 ENCOUNTER — PATIENT OUTREACH (OUTPATIENT)
Dept: CASE MANAGEMENT | Facility: OTHER | Age: 84
End: 2019-04-17

## 2019-04-24 ENCOUNTER — PATIENT OUTREACH (OUTPATIENT)
Dept: CASE MANAGEMENT | Facility: OTHER | Age: 84
End: 2019-04-24

## 2019-04-25 PROBLEM — I48.0 PAROXYSMAL ATRIAL FIBRILLATION (HCC): Status: ACTIVE | Noted: 2019-04-25

## 2019-04-30 ENCOUNTER — OFFICE VISIT (OUTPATIENT)
Dept: CARDIOLOGY CLINIC | Facility: CLINIC | Age: 84
End: 2019-04-30
Payer: MEDICARE

## 2019-04-30 ENCOUNTER — TELEPHONE (OUTPATIENT)
Dept: CARDIOLOGY CLINIC | Facility: CLINIC | Age: 84
End: 2019-04-30

## 2019-04-30 VITALS
BODY MASS INDEX: 20.62 KG/M2 | DIASTOLIC BLOOD PRESSURE: 54 MMHG | WEIGHT: 131.4 LBS | HEIGHT: 67 IN | SYSTOLIC BLOOD PRESSURE: 130 MMHG | HEART RATE: 50 BPM

## 2019-04-30 DIAGNOSIS — I48.91 ATRIAL FIBRILLATION WITH RVR (HCC): ICD-10-CM

## 2019-04-30 DIAGNOSIS — I48.0 PAROXYSMAL ATRIAL FIBRILLATION (HCC): Primary | ICD-10-CM

## 2019-04-30 PROCEDURE — 99213 OFFICE O/P EST LOW 20 MIN: CPT | Performed by: PHYSICIAN ASSISTANT

## 2019-04-30 PROCEDURE — 93000 ELECTROCARDIOGRAM COMPLETE: CPT | Performed by: PHYSICIAN ASSISTANT

## 2019-04-30 RX ORDER — METOPROLOL SUCCINATE 50 MG/1
50 TABLET, EXTENDED RELEASE ORAL DAILY
Qty: 90 TABLET | Refills: 3 | Status: SHIPPED | OUTPATIENT
Start: 2019-04-30 | End: 2019-04-30 | Stop reason: SDUPTHER

## 2019-04-30 RX ORDER — METOPROLOL SUCCINATE 50 MG/1
50 TABLET, EXTENDED RELEASE ORAL DAILY
Qty: 90 TABLET | Refills: 3 | Status: SHIPPED | OUTPATIENT
Start: 2019-04-30

## 2019-04-30 RX ORDER — FUROSEMIDE 20 MG/1
20 TABLET ORAL DAILY
Qty: 7 TABLET | Refills: 0 | Status: SHIPPED | OUTPATIENT
Start: 2019-04-30 | End: 2019-05-06 | Stop reason: SDUPTHER

## 2019-04-30 RX ORDER — POTASSIUM CHLORIDE 750 MG/1
10 CAPSULE, EXTENDED RELEASE ORAL 2 TIMES DAILY
Qty: 7 CAPSULE | Refills: 0 | Status: SHIPPED | OUTPATIENT
Start: 2019-04-30 | End: 2020-03-19

## 2019-05-03 ENCOUNTER — LAB REQUISITION (OUTPATIENT)
Dept: LAB | Facility: HOSPITAL | Age: 84
End: 2019-05-03
Payer: MEDICARE

## 2019-05-03 DIAGNOSIS — E11.65 TYPE 2 DIABETES MELLITUS WITH HYPERGLYCEMIA (HCC): ICD-10-CM

## 2019-05-03 DIAGNOSIS — E11.22 TYPE 2 DIABETES MELLITUS WITH DIABETIC CHRONIC KIDNEY DISEASE (HCC): ICD-10-CM

## 2019-05-03 LAB
ANION GAP SERPL CALCULATED.3IONS-SCNC: 7 MMOL/L (ref 4–13)
BUN SERPL-MCNC: 28 MG/DL (ref 5–25)
CALCIUM SERPL-MCNC: 9.2 MG/DL (ref 8.3–10.1)
CHLORIDE SERPL-SCNC: 102 MMOL/L (ref 100–108)
CO2 SERPL-SCNC: 31 MMOL/L (ref 21–32)
CREAT SERPL-MCNC: 1.16 MG/DL (ref 0.6–1.3)
GFR SERPL CREATININE-BSD FRML MDRD: 57 ML/MIN/1.73SQ M
GLUCOSE SERPL-MCNC: 252 MG/DL (ref 65–140)
POTASSIUM SERPL-SCNC: 4.6 MMOL/L (ref 3.5–5.3)
SODIUM SERPL-SCNC: 140 MMOL/L (ref 136–145)

## 2019-05-03 PROCEDURE — 80048 BASIC METABOLIC PNL TOTAL CA: CPT | Performed by: PHYSICIAN ASSISTANT

## 2019-05-04 PROBLEM — R60.0 BILATERAL LOWER EXTREMITY EDEMA: Status: ACTIVE | Noted: 2019-05-04

## 2019-05-06 ENCOUNTER — OFFICE VISIT (OUTPATIENT)
Dept: CARDIOLOGY CLINIC | Facility: CLINIC | Age: 84
End: 2019-05-06
Payer: MEDICARE

## 2019-05-06 VITALS
BODY MASS INDEX: 19.93 KG/M2 | SYSTOLIC BLOOD PRESSURE: 112 MMHG | HEART RATE: 62 BPM | DIASTOLIC BLOOD PRESSURE: 50 MMHG | HEIGHT: 67 IN | WEIGHT: 127 LBS

## 2019-05-06 DIAGNOSIS — I48.0 PAROXYSMAL ATRIAL FIBRILLATION (HCC): Primary | ICD-10-CM

## 2019-05-06 DIAGNOSIS — N18.30 CKD (CHRONIC KIDNEY DISEASE) STAGE 3, GFR 30-59 ML/MIN (HCC): ICD-10-CM

## 2019-05-06 DIAGNOSIS — R60.0 BILATERAL LOWER EXTREMITY EDEMA: ICD-10-CM

## 2019-05-06 PROCEDURE — 99214 OFFICE O/P EST MOD 30 MIN: CPT | Performed by: PHYSICIAN ASSISTANT

## 2019-05-06 RX ORDER — FUROSEMIDE 20 MG/1
20 TABLET ORAL DAILY
Qty: 30 TABLET | Refills: 3 | Status: SHIPPED | OUTPATIENT
Start: 2019-05-06 | End: 2020-03-19 | Stop reason: ALTCHOICE

## 2019-05-09 ENCOUNTER — PATIENT OUTREACH (OUTPATIENT)
Dept: CASE MANAGEMENT | Facility: OTHER | Age: 84
End: 2019-05-09

## 2019-05-22 ENCOUNTER — LAB REQUISITION (OUTPATIENT)
Dept: LAB | Facility: HOSPITAL | Age: 84
End: 2019-05-22
Payer: MEDICARE

## 2019-05-22 DIAGNOSIS — I48.20 CHRONIC ATRIAL FIBRILLATION (HCC): ICD-10-CM

## 2019-05-22 DIAGNOSIS — R60.1 GENERALIZED EDEMA: ICD-10-CM

## 2019-05-22 DIAGNOSIS — E11.9 TYPE 2 DIABETES MELLITUS WITHOUT COMPLICATIONS (HCC): ICD-10-CM

## 2019-05-22 LAB
ANION GAP SERPL CALCULATED.3IONS-SCNC: 6 MMOL/L (ref 4–13)
BUN SERPL-MCNC: 37 MG/DL (ref 5–25)
CALCIUM SERPL-MCNC: 8 MG/DL (ref 8.3–10.1)
CHLORIDE SERPL-SCNC: 101 MMOL/L (ref 100–108)
CO2 SERPL-SCNC: 32 MMOL/L (ref 21–32)
CREAT SERPL-MCNC: 1.45 MG/DL (ref 0.6–1.3)
ERYTHROCYTE [DISTWIDTH] IN BLOOD BY AUTOMATED COUNT: 12.2 % (ref 11.6–15.1)
GFR SERPL CREATININE-BSD FRML MDRD: 44 ML/MIN/1.73SQ M
GLUCOSE SERPL-MCNC: 391 MG/DL (ref 65–140)
HCT VFR BLD AUTO: 39.7 % (ref 36.5–49.3)
HGB BLD-MCNC: 12.8 G/DL (ref 12–17)
MCH RBC QN AUTO: 30.2 PG (ref 26.8–34.3)
MCHC RBC AUTO-ENTMCNC: 32.2 G/DL (ref 31.4–37.4)
MCV RBC AUTO: 94 FL (ref 82–98)
PLATELET # BLD AUTO: 149 THOUSANDS/UL (ref 149–390)
PMV BLD AUTO: 10.9 FL (ref 8.9–12.7)
POTASSIUM SERPL-SCNC: 4.3 MMOL/L (ref 3.5–5.3)
RBC # BLD AUTO: 4.24 MILLION/UL (ref 3.88–5.62)
SODIUM SERPL-SCNC: 139 MMOL/L (ref 136–145)
WBC # BLD AUTO: 6.92 THOUSAND/UL (ref 4.31–10.16)

## 2019-05-22 PROCEDURE — 80048 BASIC METABOLIC PNL TOTAL CA: CPT | Performed by: INTERNAL MEDICINE

## 2019-05-22 PROCEDURE — 85027 COMPLETE CBC AUTOMATED: CPT | Performed by: INTERNAL MEDICINE

## 2019-05-28 ENCOUNTER — PATIENT OUTREACH (OUTPATIENT)
Dept: CASE MANAGEMENT | Facility: OTHER | Age: 84
End: 2019-05-28

## 2019-06-11 ENCOUNTER — PATIENT OUTREACH (OUTPATIENT)
Dept: CASE MANAGEMENT | Facility: OTHER | Age: 84
End: 2019-06-11

## 2019-06-18 ENCOUNTER — PATIENT OUTREACH (OUTPATIENT)
Dept: CASE MANAGEMENT | Facility: OTHER | Age: 84
End: 2019-06-18

## 2019-06-20 ENCOUNTER — OFFICE VISIT (OUTPATIENT)
Dept: CARDIOLOGY CLINIC | Facility: CLINIC | Age: 84
End: 2019-06-20
Payer: MEDICARE

## 2019-06-20 VITALS
DIASTOLIC BLOOD PRESSURE: 62 MMHG | SYSTOLIC BLOOD PRESSURE: 126 MMHG | HEIGHT: 67 IN | WEIGHT: 128 LBS | RESPIRATION RATE: 16 BRPM | BODY MASS INDEX: 20.09 KG/M2 | HEART RATE: 72 BPM

## 2019-06-20 DIAGNOSIS — R60.0 BILATERAL LOWER EXTREMITY EDEMA: Primary | ICD-10-CM

## 2019-06-20 DIAGNOSIS — I48.0 PAROXYSMAL ATRIAL FIBRILLATION (HCC): ICD-10-CM

## 2019-06-20 PROCEDURE — 99214 OFFICE O/P EST MOD 30 MIN: CPT | Performed by: INTERNAL MEDICINE

## 2019-06-26 ENCOUNTER — PATIENT OUTREACH (OUTPATIENT)
Dept: CASE MANAGEMENT | Facility: OTHER | Age: 84
End: 2019-06-26

## 2019-07-05 ENCOUNTER — PATIENT OUTREACH (OUTPATIENT)
Dept: CASE MANAGEMENT | Facility: OTHER | Age: 84
End: 2019-07-05

## 2019-07-05 DIAGNOSIS — E11.65 TYPE 2 DIABETES MELLITUS WITH HYPERGLYCEMIA, WITHOUT LONG-TERM CURRENT USE OF INSULIN (HCC): ICD-10-CM

## 2019-07-05 RX ORDER — INSULIN GLARGINE 100 [IU]/ML
INJECTION, SOLUTION SUBCUTANEOUS
Qty: 30 ML | Refills: 0 | OUTPATIENT
Start: 2019-07-05

## 2019-07-05 NOTE — PROGRESS NOTES
Unsuccessful attempt at reaching patient  Left name, call back number and office hours asking for return call

## 2019-07-15 ENCOUNTER — PATIENT OUTREACH (OUTPATIENT)
Dept: CASE MANAGEMENT | Facility: OTHER | Age: 84
End: 2019-07-15

## 2019-07-15 ENCOUNTER — EPISODE CHANGES (OUTPATIENT)
Dept: CASE MANAGEMENT | Facility: OTHER | Age: 84
End: 2019-07-15

## 2019-09-16 ENCOUNTER — OFFICE VISIT (OUTPATIENT)
Dept: CARDIOLOGY CLINIC | Facility: CLINIC | Age: 84
End: 2019-09-16
Payer: MEDICARE

## 2019-09-16 VITALS
DIASTOLIC BLOOD PRESSURE: 60 MMHG | HEART RATE: 66 BPM | BODY MASS INDEX: 20.4 KG/M2 | OXYGEN SATURATION: 95 % | SYSTOLIC BLOOD PRESSURE: 120 MMHG | WEIGHT: 130 LBS | HEIGHT: 67 IN

## 2019-09-16 DIAGNOSIS — I48.0 PAROXYSMAL ATRIAL FIBRILLATION (HCC): Primary | ICD-10-CM

## 2019-09-16 PROCEDURE — 99213 OFFICE O/P EST LOW 20 MIN: CPT | Performed by: INTERNAL MEDICINE

## 2019-09-16 PROCEDURE — 1124F ACP DISCUSS-NO DSCNMKR DOCD: CPT | Performed by: INTERNAL MEDICINE

## 2019-09-16 NOTE — PROGRESS NOTES
[address] [facility] [place]  [address]  [facility] [address], [address] [facility]  [phone]    Cardiology Follow up    Patient:  Sadi Caruso  :  1934  MRN:  [id number]    History of Present Illness:     59-year-old man with past medical history of paroxysmal atrial fibrillation, diabetes, chronic renal insufficiency, lower extremity edema presents for cardiology follow-up  He is feeling well and is still very active  He denies any chest pain, shortness of breath, palpitations, dizziness, or syncope  He checks his pulse periodically  Patient Active Problem List   Diagnosis    Type 2 diabetes mellitus with hyperglycemia, without long-term current use of insulin (HCC)    Leukocytosis    Protein-calorie malnutrition (HCC)    CKD (chronic kidney disease) stage 3, GFR 30-59 ml/min (HCC)    Paroxysmal atrial fibrillation (HCC)    Bilateral lower extremity edema       Past Surgical History  No past surgical history on file  Social History   Social History     Socioeconomic History    Marital status:      Spouse name: Not on file    Number of children: 1    Years of education: Not on file    Highest education level: Not on file   Occupational History    Not on file   Social Needs    Financial resource strain: Not very hard    Food insecurity:     Worry: Never true     Inability: Never true    Transportation needs:     Medical: No     Non-medical: No   Tobacco Use    Smoking status: Never Smoker    Smokeless tobacco: Never Used   Substance and Sexual Activity    Alcohol use: Never     Frequency: Never    Drug use: Never    Sexual activity: Not Currently   Lifestyle    Physical activity:     Days per week: 7 days     Minutes per session: 30 min    Stress:  To some extent   Relationships    Social connections:     Talks on phone: More than three times a week     Gets together: Twice a week     Attends Yazdanism service: 1 to 4 times per year     Active member of club or organization: Yes     Attends meetings of clubs or organizations: 1 to 4 times per year     Relationship status:     Intimate partner violence:     Fear of current or ex partner: Not on file     Emotionally abused: Not on file     Physically abused: Not on file     Forced sexual activity: Not on file   Other Topics Concern    Not on file   Social History Narrative    Not on file        No Known Allergies    Family History   No family history on file  Review of Systems:  Review of Systems   Constitutional: Negative for chills, fatigue and fever  HENT: Negative for hearing loss and trouble swallowing  Eyes: Negative for pain  Respiratory: Negative for cough, chest tightness and shortness of breath  Cardiovascular: Negative for chest pain, palpitations and leg swelling  Gastrointestinal: Negative for abdominal pain, blood in stool, nausea and vomiting  Endocrine: Negative for cold intolerance and heat intolerance  Genitourinary: Negative for difficulty urinating, frequency and hematuria  Musculoskeletal: Negative for arthralgias and neck pain  Skin: Negative for rash  Allergic/Immunologic: Negative for environmental allergies  Neurological: Negative for dizziness, weakness and headaches  Hematological: Does not bruise/bleed easily  Psychiatric/Behavioral: Negative for decreased concentration and sleep disturbance  The patient is not nervous/anxious            Current Outpatient Medications:     apixaban (ELIQUIS) 2 5 mg, Take 1 tablet (2 5 mg total) by mouth 2 (two) times a day, Disp: 60 tablet, Rfl: 3    Empagliflozin (JARDIANCE) 25 MG TABS, Take 25 mg by mouth every morning, Disp: , Rfl:     furosemide (LASIX) 20 mg tablet, Take 1 tablet (20 mg total) by mouth daily, Disp: 30 tablet, Rfl: 3    glimepiride (AMARYL) 4 mg tablet, Take 4 mg by mouth every morning before breakfast, Disp: , Rfl:     hydrOXYzine HCL (ATARAX) 25 mg tablet, Take 25 mg by mouth every 6 (six) hours as needed for itching, Disp: , Rfl:     insulin glargine (LANTUS) 100 units/mL subcutaneous injection, Inject 10 Units under the skin daily before breakfast for 30 days, Disp: 300 Units, Rfl: 0    metFORMIN (GLUCOPHAGE) 1000 MG tablet, Take 1,000 mg by mouth 2 (two) times a day with meals, Disp: , Rfl:     metoprolol succinate (TOPROL-XL) 50 mg 24 hr tablet, Take 1 tablet (50 mg total) by mouth daily, Disp: 90 tablet, Rfl: 3    MULTIPLE VITAMIN PO, Take by mouth, Disp: , Rfl:     Omega-3 Fatty Acids (FISH OIL OMEGA-3 PO), Take by mouth, Disp: , Rfl:     potassium chloride (MICRO-K) 10 MEQ CR capsule, Take 1 capsule (10 mEq total) by mouth 2 (two) times a day, Disp: 7 capsule, Rfl: 0     Physical Exam:    Vitals:    09/16/19 1356   BP: 120/60   BP Location: Right arm   Patient Position: Sitting   Cuff Size: Adult   Pulse: 66   SpO2: 95%   Weight: 59 kg (130 lb)   Height: 5' 7" (1 702 m)       Physical Exam   Constitutional: He is oriented to person, place, and time  He appears well-developed and well-nourished  HENT:   Head: Normocephalic  Right Ear: External ear normal    Left Ear: External ear normal    Mouth/Throat: Oropharynx is clear and moist    Eyes: Pupils are equal, round, and reactive to light  Neck: No JVD present  Carotid bruit is not present  Cardiovascular: Normal rate, regular rhythm and intact distal pulses  Exam reveals no gallop and no friction rub  No murmur heard  Pulmonary/Chest: Effort normal and breath sounds normal  No tachypnea  No respiratory distress  He has no wheezes  He has no rales  He exhibits no tenderness  Abdominal: Soft  He exhibits no distension  There is no tenderness  There is no rebound and no guarding  Musculoskeletal: He exhibits no edema  Neurological: He is alert and oriented to person, place, and time  Skin: Skin is warm and dry  Psychiatric: He has a normal mood and affect   His behavior is normal  Judgment and thought content normal    Nursing note and vitals reviewed  Labs:not applicable    Assessment/Plan:    1  Paroxysmal atrial fibrillation  He continues on the beta-blocker and Eliquis  He has had no clinical recurrence  2  Lower extremity edema  This is been stable off the Lasix  3  Prevention of cardiovascular disease  A statin could be considered  Will see him back in 6 months time  Thank you so much, please not hesitate to contact me with any questions or concerns          Phillip Mendieta MD  9/16/2019  2:02 PM

## 2019-11-01 ENCOUNTER — LAB REQUISITION (OUTPATIENT)
Dept: LAB | Facility: HOSPITAL | Age: 84
End: 2019-11-01
Payer: MEDICARE

## 2019-11-01 DIAGNOSIS — E11.9 TYPE 2 DIABETES MELLITUS WITHOUT COMPLICATIONS (HCC): ICD-10-CM

## 2019-11-01 DIAGNOSIS — I48.20 CHRONIC ATRIAL FIBRILLATION, UNSPECIFIED (HCC): ICD-10-CM

## 2019-11-01 LAB
ANION GAP SERPL CALCULATED.3IONS-SCNC: 6 MMOL/L (ref 4–13)
BUN SERPL-MCNC: 38 MG/DL (ref 5–25)
CALCIUM SERPL-MCNC: 9.5 MG/DL (ref 8.3–10.1)
CHLORIDE SERPL-SCNC: 108 MMOL/L (ref 100–108)
CO2 SERPL-SCNC: 30 MMOL/L (ref 21–32)
CREAT SERPL-MCNC: 1.2 MG/DL (ref 0.6–1.3)
EST. AVERAGE GLUCOSE BLD GHB EST-MCNC: 200 MG/DL
GFR SERPL CREATININE-BSD FRML MDRD: 55 ML/MIN/1.73SQ M
GLUCOSE SERPL-MCNC: 100 MG/DL (ref 65–140)
HBA1C MFR BLD: 8.6 % (ref 4.2–6.3)
POTASSIUM SERPL-SCNC: 4.3 MMOL/L (ref 3.5–5.3)
SODIUM SERPL-SCNC: 144 MMOL/L (ref 136–145)

## 2019-11-01 PROCEDURE — 83036 HEMOGLOBIN GLYCOSYLATED A1C: CPT | Performed by: INTERNAL MEDICINE

## 2019-11-01 PROCEDURE — 80048 BASIC METABOLIC PNL TOTAL CA: CPT | Performed by: INTERNAL MEDICINE

## 2020-01-20 NOTE — PROGRESS NOTES
Spoke with patient who was pleasant and informative of his recent [employer] tournaments  States he is feeling just fine  Denies and SOB or edema  Reports he placed 2nd in the [employer] last weekend  States he placed 2nd in [facility] about 2 weeks ago  States he has over 100 trophies just in 1st place   yesterday  Denies any episodes hypo / hyper glycemia  Has doctor appointment on Wed with Dr Reema Cotton  This care manager informed patient that his bundle program has ended and this care manager will not longer be actively reaching out to patient  Patient verbalized understanding  Care manager encouraged patient to reach out to this care manager as needed in the future  Patient acknowledged receipt of contact information mailed previously and agreed

## 2020-03-02 ENCOUNTER — LAB REQUISITION (OUTPATIENT)
Dept: LAB | Facility: HOSPITAL | Age: 85
End: 2020-03-02
Payer: COMMERCIAL

## 2020-03-02 DIAGNOSIS — E11.9 TYPE 2 DIABETES MELLITUS WITHOUT COMPLICATIONS (HCC): ICD-10-CM

## 2020-03-02 DIAGNOSIS — I48.20 CHRONIC ATRIAL FIBRILLATION, UNSPECIFIED (HCC): ICD-10-CM

## 2020-03-02 LAB
ANION GAP SERPL CALCULATED.3IONS-SCNC: 5 MMOL/L (ref 4–13)
BUN SERPL-MCNC: 25 MG/DL (ref 5–25)
CALCIUM SERPL-MCNC: 8.8 MG/DL (ref 8.3–10.1)
CHLORIDE SERPL-SCNC: 107 MMOL/L (ref 100–108)
CO2 SERPL-SCNC: 28 MMOL/L (ref 21–32)
CREAT SERPL-MCNC: 1.23 MG/DL (ref 0.6–1.3)
EST. AVERAGE GLUCOSE BLD GHB EST-MCNC: 166 MG/DL
GFR SERPL CREATININE-BSD FRML MDRD: 53 ML/MIN/1.73SQ M
GLUCOSE SERPL-MCNC: 223 MG/DL (ref 65–140)
HBA1C MFR BLD: 7.4 %
POTASSIUM SERPL-SCNC: 4.6 MMOL/L (ref 3.5–5.3)
SODIUM SERPL-SCNC: 140 MMOL/L (ref 136–145)

## 2020-03-02 PROCEDURE — 80048 BASIC METABOLIC PNL TOTAL CA: CPT | Performed by: INTERNAL MEDICINE

## 2020-03-02 PROCEDURE — 83036 HEMOGLOBIN GLYCOSYLATED A1C: CPT | Performed by: INTERNAL MEDICINE

## 2020-03-18 ENCOUNTER — TELEPHONE (OUTPATIENT)
Dept: CARDIOLOGY CLINIC | Facility: CLINIC | Age: 85
End: 2020-03-18

## 2020-03-19 ENCOUNTER — OFFICE VISIT (OUTPATIENT)
Dept: CARDIOLOGY CLINIC | Facility: CLINIC | Age: 85
End: 2020-03-19
Payer: COMMERCIAL

## 2020-03-19 VITALS
RESPIRATION RATE: 16 BRPM | SYSTOLIC BLOOD PRESSURE: 128 MMHG | HEIGHT: 67 IN | HEART RATE: 69 BPM | BODY MASS INDEX: 19.78 KG/M2 | DIASTOLIC BLOOD PRESSURE: 62 MMHG | WEIGHT: 126 LBS

## 2020-03-19 DIAGNOSIS — R60.0 BILATERAL LOWER EXTREMITY EDEMA: ICD-10-CM

## 2020-03-19 DIAGNOSIS — E44.1 MILD PROTEIN-CALORIE MALNUTRITION (HCC): ICD-10-CM

## 2020-03-19 DIAGNOSIS — N18.30 CKD (CHRONIC KIDNEY DISEASE) STAGE 3, GFR 30-59 ML/MIN (HCC): ICD-10-CM

## 2020-03-19 DIAGNOSIS — I48.0 PAROXYSMAL ATRIAL FIBRILLATION (HCC): Primary | ICD-10-CM

## 2020-03-19 DIAGNOSIS — E11.65 TYPE 2 DIABETES MELLITUS WITH HYPERGLYCEMIA, WITHOUT LONG-TERM CURRENT USE OF INSULIN (HCC): ICD-10-CM

## 2020-03-19 DIAGNOSIS — E78.2 MIXED HYPERLIPIDEMIA: ICD-10-CM

## 2020-03-19 PROCEDURE — 99215 OFFICE O/P EST HI 40 MIN: CPT | Performed by: INTERNAL MEDICINE

## 2020-03-19 PROCEDURE — 93000 ELECTROCARDIOGRAM COMPLETE: CPT | Performed by: INTERNAL MEDICINE

## 2020-03-19 NOTE — PROGRESS NOTES
Cardiology Follow Up    Wojciech Hutton  1934  [id number]  [address] [place] [id number] [phone]    1  Paroxysmal atrial fibrillation (HCC)  POCT ECG   2  Type 2 diabetes mellitus with hyperglycemia, without long-term current use of insulin ([facility])     3  CKD (chronic kidney disease) stage 3, GFR 30-59 ml/min ([facility])     4  Bilateral lower extremity edema     5  Mild protein-calorie malnutrition ([address] )     6  Mixed hyperlipidemia  Lipid Panel with Direct LDL reflex       04/08/2019 echocardiogram:  SUMMARY   LEFT VENTRICLE:  Systolic function was normal  Ejection fraction was estimated to be 65 %  There were no regional wall motion abnormalities  Doppler parameters were consistent with abnormal left ventricular relaxation (grade 1 diastolic dysfunction)   LEFT ATRIUM:  The atrium was mildly dilated    MITRAL VALVE:  There was mild regurgitation   AORTIC VALVE:  The valve was trileaflet  Leaflets exhibited normal thickness, moderate calcification, and moderately reduced cuspal separation  There was mild stenosis  There was trace to mild regurgitation    TRICUSPID VALVE:  There was mild regurgitation  Estimated peak PA pressure was 25 mmHg  Interval History:  1 year ago, patient was hospitalized with new onset atrial fibrillation with a rapid ventricular response following the death of his wife  He converted to normal sinus rhythm on IV Cardizem and has remained in normal sinus rhythm  He is on Eliquis  Shortly after that he developed a problem of lower extremity edema and was placed on furosemide  That is adequately cleared in the furosemide has been discontinued  Presently he has no complaints      Patient Active Problem List   Diagnosis    Type 2 diabetes mellitus with hyperglycemia, without long-term current use of insulin ([facility])    Leukocytosis    Protein-calorie malnutrition ([address] )    CKD (chronic kidney disease) stage 3, GFR 30-59 ml/min (HCC)    Paroxysmal atrial fibrillation (HCC)    Bilateral lower extremity edema     History reviewed  No pertinent past medical history  Social History     Socioeconomic History    Marital status:      Spouse name: Not on file    Number of children: 1    Years of education: Not on file    Highest education level: Not on file   Occupational History    Not on file   Social Needs    Financial resource strain: Not very hard    Food insecurity:     Worry: Never true     Inability: Never true    Transportation needs:     Medical: No     Non-medical: No   Tobacco Use    Smoking status: Never Smoker    Smokeless tobacco: Never Used   Substance and Sexual Activity    Alcohol use: Never     Frequency: Never    Drug use: Never    Sexual activity: Not Currently   Lifestyle    Physical activity:     Days per week: 7 days     Minutes per session: 30 min    Stress: To some extent   Relationships    Social connections:     Talks on phone: More than three times a week     Gets together: Twice a week     Attends Che[employer] service: 1 to 4 times per year     Active member of club or organization: Yes     Attends meetings of clubs or organizations: 1 to 4 times per year     Relationship status:     Intimate partner violence:     Fear of current or ex partner: Not on file     Emotionally abused: Not on file     Physically abused: Not on file     Forced sexual activity: Not on file   Other Topics Concern    Not on file   Social History Narrative    Not on file      History reviewed  No pertinent family history  History reviewed  No pertinent surgical history      Current Outpatient Medications:     apixaban (ELIQUIS) 2 5 mg, Take 1 tablet (2 5 mg total) by mouth 2 (two) times a day, Disp: 60 tablet, Rfl: 3    glimepiride (AMARYL) 4 mg tablet, Take 4 mg by mouth every morning before breakfast, Disp: , Rfl:     hydrOXYzine HCL (ATARAX) 25 mg tablet, Take 25 mg by mouth every 6 (six) hours as needed for itching, Disp: , Rfl:     metFORMIN (GLUCOPHAGE) 1000 MG tablet, Take 1,000 mg by mouth 2 (two) times a day with meals, Disp: , Rfl:     metoprolol succinate (TOPROL-XL) 50 mg 24 hr tablet, Take 1 tablet (50 mg total) by mouth daily, Disp: 90 tablet, Rfl: 3    MULTIPLE VITAMIN PO, Take by mouth, Disp: , Rfl:     Omega-3 Fatty Acids (FISH OIL OMEGA-3 PO), Take by mouth, Disp: , Rfl:     insulin glargine (LANTUS) 100 units/mL subcutaneous injection, Inject 10 Units under the skin daily before breakfast for 30 days, Disp: 300 Units, Rfl: 0  No Known Allergies    Results for orders placed or performed in visit on 03/19/20   POCT ECG    Narrative    Normal sinus rhythm at a rate of 69 beats per minute  Normal tracing           Lab Results   Component Value Date    CHOLESTEROL 203 (H) 02/23/2016     Lab Results   Component Value Date    HDL 35 (L) 02/23/2016    HDL 43 02/13/2015     Lab Results   Component Value Date    TRIG 144 02/23/2016    TRIG 80 02/13/2015     No results found for: [facility]  Lab Results   Component Value Date    SODIUM 140 03/02/2020    K 4 6 03/02/2020     03/02/2020    CO2 28 03/02/2020    BUN 25 03/02/2020    CREATININE 1 23 03/02/2020    GLUC 223 (H) 03/02/2020    CALCIUM 8 8 03/02/2020     Lab Results   Component Value Date     06/15/2015    SODIUM 140 03/02/2020    K 4 6 03/02/2020     03/02/2020    CO2 28 03/02/2020    ANIONGAP 5 06/15/2015    AGAP 5 03/02/2020    BUN 25 03/02/2020    CREATININE 1 23 03/02/2020    GLUC 223 (H) 03/02/2020    GLUF 214 (H) 07/18/2017    CALCIUM 8 8 03/02/2020    AST 15 04/06/2019    ALT 22 04/06/2019    ALKPHOS 89 04/06/2019    PROT 7 3 02/13/2015    TP 7 7 04/06/2019    BILITOT 0 41 02/13/2015    TBILI 0 96 04/06/2019    EGFR 53 03/02/2020     Lab Results   Component Value Date    WBC 6 92 05/22/2019    HGB 12 8 05/22/2019    HCT 39 7 05/22/2019    MCV 94 05/22/2019     05/22/2019 Lab Results   Component Value Date    [id number] 2 854 04/06/2019         Review of Systems:  Review of Systems   Constitutional: Negative  HENT: Negative  Eyes: Negative  Respiratory: Negative for chest tightness and shortness of breath  Cardiovascular: Negative for chest pain, palpitations and leg swelling  Gastrointestinal: Negative  Endocrine: Negative  Musculoskeletal: Negative  Skin: Negative  Allergic/Immunologic: Negative  Neurological: Negative  Hematological: Negative  Psychiatric/Behavioral: Negative  Physical Exam:  /62   Pulse 69   Resp 16   Ht 5' 7" (1 702 m)   Wt 57 2 kg (126 lb)   BMI 19 73 kg/m²    Physical Exam   Constitutional: He is oriented to person, place, and time  He appears well-developed and well-nourished  HENT:   Head: Normocephalic and atraumatic  Neck: Normal range of motion  Neck supple  No JVD present  No tracheal deviation present  Cardiovascular: Normal rate, regular rhythm, normal heart sounds and intact distal pulses  Pulmonary/Chest: Effort normal and breath sounds normal  No respiratory distress  He has no wheezes  He has no rales  Abdominal: Soft  Bowel sounds are normal    Musculoskeletal: He exhibits no edema  Neurological: He is alert and oriented to person, place, and time  Skin: Skin is warm and dry  Psychiatric: He has a normal mood and affect  His behavior is normal        Discussion/Summary:  1  Obtain fasting lipid profile with reflex direct LDL  2   Return in 6 months

## 2020-07-06 ENCOUNTER — LAB REQUISITION (OUTPATIENT)
Dept: LAB | Facility: HOSPITAL | Age: 85
End: 2020-07-06
Payer: COMMERCIAL

## 2020-07-06 DIAGNOSIS — E78.5 HYPERLIPIDEMIA, UNSPECIFIED: ICD-10-CM

## 2020-07-06 DIAGNOSIS — E11.9 TYPE 2 DIABETES MELLITUS WITHOUT COMPLICATIONS (HCC): ICD-10-CM

## 2020-07-06 LAB
ALBUMIN SERPL BCP-MCNC: 4.3 G/DL (ref 3.5–5)
ALP SERPL-CCNC: 45 U/L (ref 46–116)
ALT SERPL W P-5'-P-CCNC: 20 U/L (ref 12–78)
ANION GAP SERPL CALCULATED.3IONS-SCNC: 7 MMOL/L (ref 4–13)
AST SERPL W P-5'-P-CCNC: 14 U/L (ref 5–45)
BILIRUB SERPL-MCNC: 0.98 MG/DL (ref 0.2–1)
BUN SERPL-MCNC: 31 MG/DL (ref 5–25)
CALCIUM SERPL-MCNC: 9 MG/DL (ref 8.3–10.1)
CHLORIDE SERPL-SCNC: 107 MMOL/L (ref 100–108)
CHOLEST SERPL-MCNC: 186 MG/DL (ref 50–200)
CO2 SERPL-SCNC: 28 MMOL/L (ref 21–32)
CREAT SERPL-MCNC: 1.15 MG/DL (ref 0.6–1.3)
EST. AVERAGE GLUCOSE BLD GHB EST-MCNC: 146 MG/DL
GFR SERPL CREATININE-BSD FRML MDRD: 57 ML/MIN/1.73SQ M
GLUCOSE SERPL-MCNC: 53 MG/DL (ref 65–140)
HBA1C MFR BLD: 6.7 %
HDLC SERPL-MCNC: 34 MG/DL
LDLC SERPL CALC-MCNC: 134 MG/DL (ref 0–100)
NONHDLC SERPL-MCNC: 152 MG/DL
POTASSIUM SERPL-SCNC: 4.3 MMOL/L (ref 3.5–5.3)
PROT SERPL-MCNC: 7.2 G/DL (ref 6.4–8.2)
SODIUM SERPL-SCNC: 142 MMOL/L (ref 136–145)
TRIGL SERPL-MCNC: 91 MG/DL

## 2020-07-06 PROCEDURE — 80053 COMPREHEN METABOLIC PANEL: CPT | Performed by: INTERNAL MEDICINE

## 2020-07-06 PROCEDURE — 80061 LIPID PANEL: CPT | Performed by: INTERNAL MEDICINE

## 2020-07-06 PROCEDURE — 83036 HEMOGLOBIN GLYCOSYLATED A1C: CPT | Performed by: INTERNAL MEDICINE

## 2020-08-21 ENCOUNTER — TRANSCRIBE ORDERS (OUTPATIENT)
Dept: ADMINISTRATIVE | Facility: HOSPITAL | Age: 85
End: 2020-08-21

## 2020-08-21 DIAGNOSIS — F06.8 INFECTIVE ORGANIC PSYCHOSIS: Primary | ICD-10-CM

## 2020-08-30 ENCOUNTER — TRANSCRIBE ORDERS (OUTPATIENT)
Dept: ADMINISTRATIVE | Facility: HOSPITAL | Age: 85
End: 2020-08-30

## 2020-08-30 ENCOUNTER — HOSPITAL ENCOUNTER (OUTPATIENT)
Dept: CT IMAGING | Facility: HOSPITAL | Age: 85
Discharge: HOME | End: 2020-08-30
Payer: COMMERCIAL

## 2020-08-30 DIAGNOSIS — F06.8 INFECTIVE ORGANIC PSYCHOSIS: ICD-10-CM

## 2020-08-30 PROCEDURE — 70450 CT HEAD/BRAIN W/O DYE: CPT

## 2020-08-30 PROCEDURE — G1004 CDSM NDSC: HCPCS

## 2020-08-31 ENCOUNTER — LAB REQUISITION (OUTPATIENT)
Dept: LAB | Facility: HOSPITAL | Age: 85
End: 2020-08-31
Payer: COMMERCIAL

## 2020-08-31 DIAGNOSIS — R53.83 OTHER FATIGUE: ICD-10-CM

## 2020-08-31 DIAGNOSIS — G30.9 ALZHEIMER'S DISEASE, UNSPECIFIED (CODE) (HCC): ICD-10-CM

## 2020-08-31 DIAGNOSIS — E11.9 TYPE 2 DIABETES MELLITUS WITHOUT COMPLICATIONS (HCC): ICD-10-CM

## 2020-08-31 LAB
BASOPHILS # BLD AUTO: 0.05 THOUSANDS/ΜL (ref 0–0.1)
BASOPHILS NFR BLD AUTO: 1 % (ref 0–1)
EOSINOPHIL # BLD AUTO: 0.29 THOUSAND/ΜL (ref 0–0.61)
EOSINOPHIL NFR BLD AUTO: 4 % (ref 0–6)
ERYTHROCYTE [DISTWIDTH] IN BLOOD BY AUTOMATED COUNT: 12.2 % (ref 11.6–15.1)
HCT VFR BLD AUTO: 41.3 % (ref 36.5–49.3)
HGB BLD-MCNC: 13 G/DL (ref 12–17)
IMM GRANULOCYTES # BLD AUTO: 0.01 THOUSAND/UL (ref 0–0.2)
IMM GRANULOCYTES NFR BLD AUTO: 0 % (ref 0–2)
LYMPHOCYTES # BLD AUTO: 2.6 THOUSANDS/ΜL (ref 0.6–4.47)
LYMPHOCYTES NFR BLD AUTO: 40 % (ref 14–44)
MCH RBC QN AUTO: 30.1 PG (ref 26.8–34.3)
MCHC RBC AUTO-ENTMCNC: 31.5 G/DL (ref 31.4–37.4)
MCV RBC AUTO: 96 FL (ref 82–98)
MONOCYTES # BLD AUTO: 0.47 THOUSAND/ΜL (ref 0.17–1.22)
MONOCYTES NFR BLD AUTO: 7 % (ref 4–12)
NEUTROPHILS # BLD AUTO: 3.15 THOUSANDS/ΜL (ref 1.85–7.62)
NEUTS SEG NFR BLD AUTO: 48 % (ref 43–75)
NRBC BLD AUTO-RTO: 0 /100 WBCS
PLATELET # BLD AUTO: 199 THOUSANDS/UL (ref 149–390)
PMV BLD AUTO: 10.4 FL (ref 8.9–12.7)
RBC # BLD AUTO: 4.32 MILLION/UL (ref 3.88–5.62)
TSH SERPL DL<=0.05 MIU/L-ACNC: 3.44 UIU/ML (ref 0.36–3.74)
WBC # BLD AUTO: 6.57 THOUSAND/UL (ref 4.31–10.16)

## 2020-08-31 PROCEDURE — 84443 ASSAY THYROID STIM HORMONE: CPT | Performed by: INTERNAL MEDICINE

## 2020-08-31 PROCEDURE — 85025 COMPLETE CBC W/AUTO DIFF WBC: CPT | Performed by: INTERNAL MEDICINE

## 2020-09-04 ENCOUNTER — HOSPITAL ENCOUNTER (INPATIENT)
Facility: HOSPITAL | Age: 85
LOS: 4 days | Discharge: HOME | DRG: 682 | End: 2020-09-08
Attending: EMERGENCY MEDICINE | Admitting: FAMILY MEDICINE
Payer: COMMERCIAL

## 2020-09-04 DIAGNOSIS — I63.9 STROKE (CEREBRUM) (HCC): ICD-10-CM

## 2020-09-04 DIAGNOSIS — E16.2 HYPOGLYCEMIA: Primary | ICD-10-CM

## 2020-09-04 DIAGNOSIS — R41.82 ALTERED MENTAL STATE: ICD-10-CM

## 2020-09-04 LAB
ALBUMIN SERPL BCP-MCNC: 4.1 G/DL (ref 3.5–5)
ALP SERPL-CCNC: 53 U/L (ref 46–116)
ALT SERPL W P-5'-P-CCNC: 27 U/L (ref 12–78)
ANION GAP SERPL CALCULATED.3IONS-SCNC: 3 MMOL/L (ref 4–13)
APAP SERPL-MCNC: <2 UG/ML (ref 10–20)
AST SERPL W P-5'-P-CCNC: 18 U/L (ref 5–45)
BASOPHILS # BLD AUTO: 0.03 THOUSANDS/ΜL (ref 0–0.1)
BASOPHILS NFR BLD AUTO: 0 % (ref 0–1)
BILIRUB SERPL-MCNC: 0.39 MG/DL (ref 0.2–1)
BUN SERPL-MCNC: 32 MG/DL (ref 5–25)
CALCIUM SERPL-MCNC: 9.3 MG/DL (ref 8.3–10.1)
CHLORIDE SERPL-SCNC: 107 MMOL/L (ref 100–108)
CO2 SERPL-SCNC: 32 MMOL/L (ref 21–32)
CREAT SERPL-MCNC: 1.52 MG/DL (ref 0.6–1.3)
EOSINOPHIL # BLD AUTO: 0.12 THOUSAND/ΜL (ref 0–0.61)
EOSINOPHIL NFR BLD AUTO: 2 % (ref 0–6)
ERYTHROCYTE [DISTWIDTH] IN BLOOD BY AUTOMATED COUNT: 12.1 % (ref 11.6–15.1)
ETHANOL SERPL-MCNC: <3 MG/DL (ref 0–3)
GFR SERPL CREATININE-BSD FRML MDRD: 41 ML/MIN/1.73SQ M
GLUCOSE SERPL-MCNC: 83 MG/DL (ref 65–140)
GLUCOSE SERPL-MCNC: 86 MG/DL (ref 65–140)
GLUCOSE SERPL-MCNC: 93 MG/DL (ref 65–140)
HCT VFR BLD AUTO: 38.4 % (ref 36.5–49.3)
HGB BLD-MCNC: 12.5 G/DL (ref 12–17)
IMM GRANULOCYTES # BLD AUTO: 0.01 THOUSAND/UL (ref 0–0.2)
IMM GRANULOCYTES NFR BLD AUTO: 0 % (ref 0–2)
LYMPHOCYTES # BLD AUTO: 1.64 THOUSANDS/ΜL (ref 0.6–4.47)
LYMPHOCYTES NFR BLD AUTO: 23 % (ref 14–44)
MAGNESIUM SERPL-MCNC: 2 MG/DL (ref 1.6–2.6)
MCH RBC QN AUTO: 30.4 PG (ref 26.8–34.3)
MCHC RBC AUTO-ENTMCNC: 32.6 G/DL (ref 31.4–37.4)
MCV RBC AUTO: 93 FL (ref 82–98)
MONOCYTES # BLD AUTO: 0.46 THOUSAND/ΜL (ref 0.17–1.22)
MONOCYTES NFR BLD AUTO: 6 % (ref 4–12)
NEUTROPHILS # BLD AUTO: 4.88 THOUSANDS/ΜL (ref 1.85–7.62)
NEUTS SEG NFR BLD AUTO: 69 % (ref 43–75)
NRBC BLD AUTO-RTO: 0 /100 WBCS
PLATELET # BLD AUTO: 166 THOUSANDS/UL (ref 149–390)
PMV BLD AUTO: 9.7 FL (ref 8.9–12.7)
POTASSIUM SERPL-SCNC: 4.7 MMOL/L (ref 3.5–5.3)
PROT SERPL-MCNC: 7.5 G/DL (ref 6.4–8.2)
RBC # BLD AUTO: 4.11 MILLION/UL (ref 3.88–5.62)
SALICYLATES SERPL-MCNC: <3 MG/DL (ref 3–20)
SODIUM SERPL-SCNC: 142 MMOL/L (ref 136–145)
TSH SERPL DL<=0.05 MIU/L-ACNC: 3.09 UIU/ML (ref 0.36–3.74)
WBC # BLD AUTO: 7.14 THOUSAND/UL (ref 4.31–10.16)

## 2020-09-04 PROCEDURE — 36415 COLL VENOUS BLD VENIPUNCTURE: CPT

## 2020-09-04 PROCEDURE — 96360 HYDRATION IV INFUSION INIT: CPT

## 2020-09-04 PROCEDURE — 82607 VITAMIN B-12: CPT | Performed by: NURSE PRACTITIONER

## 2020-09-04 PROCEDURE — 93005 ELECTROCARDIOGRAM TRACING: CPT

## 2020-09-04 PROCEDURE — 80329 ANALGESICS NON-OPIOID 1 OR 2: CPT | Performed by: EMERGENCY MEDICINE

## 2020-09-04 PROCEDURE — 99285 EMERGENCY DEPT VISIT HI MDM: CPT | Performed by: EMERGENCY MEDICINE

## 2020-09-04 PROCEDURE — 82948 REAGENT STRIP/BLOOD GLUCOSE: CPT

## 2020-09-04 PROCEDURE — 84443 ASSAY THYROID STIM HORMONE: CPT | Performed by: EMERGENCY MEDICINE

## 2020-09-04 PROCEDURE — 80053 COMPREHEN METABOLIC PANEL: CPT | Performed by: EMERGENCY MEDICINE

## 2020-09-04 PROCEDURE — 83735 ASSAY OF MAGNESIUM: CPT | Performed by: EMERGENCY MEDICINE

## 2020-09-04 PROCEDURE — 99285 EMERGENCY DEPT VISIT HI MDM: CPT

## 2020-09-04 PROCEDURE — 85025 COMPLETE CBC W/AUTO DIFF WBC: CPT | Performed by: EMERGENCY MEDICINE

## 2020-09-04 PROCEDURE — 82746 ASSAY OF FOLIC ACID SERUM: CPT | Performed by: NURSE PRACTITIONER

## 2020-09-04 PROCEDURE — 80320 DRUG SCREEN QUANTALCOHOLS: CPT | Performed by: EMERGENCY MEDICINE

## 2020-09-04 RX ADMIN — SODIUM CHLORIDE 1000 ML: 0.9 INJECTION, SOLUTION INTRAVENOUS at 21:35

## 2020-09-05 ENCOUNTER — APPOINTMENT (INPATIENT)
Dept: MRI IMAGING | Facility: HOSPITAL | Age: 85
DRG: 682 | End: 2020-09-05
Payer: COMMERCIAL

## 2020-09-05 ENCOUNTER — APPOINTMENT (INPATIENT)
Dept: CT IMAGING | Facility: HOSPITAL | Age: 85
DRG: 682 | End: 2020-09-05
Payer: COMMERCIAL

## 2020-09-05 PROBLEM — Z79.4 TYPE 2 DIABETES MELLITUS WITH HYPERGLYCEMIA, WITH LONG-TERM CURRENT USE OF INSULIN (HCC): Status: ACTIVE | Noted: 2019-04-06

## 2020-09-05 LAB
ANION GAP SERPL CALCULATED.3IONS-SCNC: 4 MMOL/L (ref 4–13)
ATRIAL RATE: 59 BPM
BASOPHILS # BLD AUTO: 0.02 THOUSANDS/ΜL (ref 0–0.1)
BASOPHILS NFR BLD AUTO: 0 % (ref 0–1)
BILIRUB UR QL STRIP: NEGATIVE
BUN SERPL-MCNC: 27 MG/DL (ref 5–25)
CALCIUM SERPL-MCNC: 8.4 MG/DL (ref 8.3–10.1)
CHLORIDE SERPL-SCNC: 107 MMOL/L (ref 100–108)
CHOLEST SERPL-MCNC: 149 MG/DL (ref 50–200)
CLARITY UR: CLEAR
CO2 SERPL-SCNC: 30 MMOL/L (ref 21–32)
COLOR UR: YELLOW
CREAT SERPL-MCNC: 1.38 MG/DL (ref 0.6–1.3)
EOSINOPHIL # BLD AUTO: 0.31 THOUSAND/ΜL (ref 0–0.61)
EOSINOPHIL NFR BLD AUTO: 5 % (ref 0–6)
ERYTHROCYTE [DISTWIDTH] IN BLOOD BY AUTOMATED COUNT: 12.2 % (ref 11.6–15.1)
EST. AVERAGE GLUCOSE BLD GHB EST-MCNC: 148 MG/DL
FOLATE SERPL-MCNC: >20 NG/ML (ref 3.1–17.5)
GFR SERPL CREATININE-BSD FRML MDRD: 46 ML/MIN/1.73SQ M
GLUCOSE SERPL-MCNC: 123 MG/DL (ref 65–140)
GLUCOSE SERPL-MCNC: 135 MG/DL (ref 65–140)
GLUCOSE SERPL-MCNC: 165 MG/DL (ref 65–140)
GLUCOSE SERPL-MCNC: 168 MG/DL (ref 65–140)
GLUCOSE SERPL-MCNC: 177 MG/DL (ref 65–140)
GLUCOSE SERPL-MCNC: 187 MG/DL (ref 65–140)
GLUCOSE SERPL-MCNC: 223 MG/DL (ref 65–140)
GLUCOSE UR STRIP-MCNC: NEGATIVE MG/DL
HBA1C MFR BLD: 6.8 %
HCT VFR BLD AUTO: 34.2 % (ref 36.5–49.3)
HDLC SERPL-MCNC: 29 MG/DL
HGB BLD-MCNC: 11.1 G/DL (ref 12–17)
HGB UR QL STRIP.AUTO: NEGATIVE
IMM GRANULOCYTES # BLD AUTO: 0.01 THOUSAND/UL (ref 0–0.2)
IMM GRANULOCYTES NFR BLD AUTO: 0 % (ref 0–2)
KETONES UR STRIP-MCNC: NEGATIVE MG/DL
LDLC SERPL CALC-MCNC: 100 MG/DL (ref 0–100)
LEUKOCYTE ESTERASE UR QL STRIP: NEGATIVE
LYMPHOCYTES # BLD AUTO: 2.51 THOUSANDS/ΜL (ref 0.6–4.47)
LYMPHOCYTES NFR BLD AUTO: 40 % (ref 14–44)
MCH RBC QN AUTO: 30.6 PG (ref 26.8–34.3)
MCHC RBC AUTO-ENTMCNC: 32.5 G/DL (ref 31.4–37.4)
MCV RBC AUTO: 94 FL (ref 82–98)
MONOCYTES # BLD AUTO: 0.44 THOUSAND/ΜL (ref 0.17–1.22)
MONOCYTES NFR BLD AUTO: 7 % (ref 4–12)
NEUTROPHILS # BLD AUTO: 3.05 THOUSANDS/ΜL (ref 1.85–7.62)
NEUTS SEG NFR BLD AUTO: 48 % (ref 43–75)
NITRITE UR QL STRIP: NEGATIVE
NRBC BLD AUTO-RTO: 0 /100 WBCS
P AXIS: 76 DEGREES
PH UR STRIP.AUTO: 7 [PH]
PLATELET # BLD AUTO: 142 THOUSANDS/UL (ref 149–390)
PMV BLD AUTO: 9.9 FL (ref 8.9–12.7)
POTASSIUM SERPL-SCNC: 4.6 MMOL/L (ref 3.5–5.3)
PR INTERVAL: 140 MS
PROT UR STRIP-MCNC: NEGATIVE MG/DL
QRS AXIS: 75 DEGREES
QRSD INTERVAL: 72 MS
QT INTERVAL: 448 MS
QTC INTERVAL: 437 MS
RBC # BLD AUTO: 3.63 MILLION/UL (ref 3.88–5.62)
SODIUM SERPL-SCNC: 141 MMOL/L (ref 136–145)
SP GR UR STRIP.AUTO: 1.01 (ref 1–1.03)
T WAVE AXIS: 34 DEGREES
TRIGL SERPL-MCNC: 102 MG/DL
UROBILINOGEN UR QL STRIP.AUTO: 0.2 E.U./DL
VENTRICULAR RATE: 57 BPM
VIT B12 SERPL-MCNC: 567 PG/ML (ref 100–900)
WBC # BLD AUTO: 6.34 THOUSAND/UL (ref 4.31–10.16)

## 2020-09-05 PROCEDURE — 99223 1ST HOSP IP/OBS HIGH 75: CPT | Performed by: FAMILY MEDICINE

## 2020-09-05 PROCEDURE — 81003 URINALYSIS AUTO W/O SCOPE: CPT | Performed by: NURSE PRACTITIONER

## 2020-09-05 PROCEDURE — 99223 1ST HOSP IP/OBS HIGH 75: CPT | Performed by: PSYCHIATRY & NEUROLOGY

## 2020-09-05 PROCEDURE — 70496 CT ANGIOGRAPHY HEAD: CPT

## 2020-09-05 PROCEDURE — 97163 PT EVAL HIGH COMPLEX 45 MIN: CPT

## 2020-09-05 PROCEDURE — 85025 COMPLETE CBC W/AUTO DIFF WBC: CPT | Performed by: NURSE PRACTITIONER

## 2020-09-05 PROCEDURE — 93010 ELECTROCARDIOGRAM REPORT: CPT | Performed by: INTERNAL MEDICINE

## 2020-09-05 PROCEDURE — 80048 BASIC METABOLIC PNL TOTAL CA: CPT | Performed by: NURSE PRACTITIONER

## 2020-09-05 PROCEDURE — 97129 THER IVNTJ 1ST 15 MIN: CPT

## 2020-09-05 PROCEDURE — 82948 REAGENT STRIP/BLOOD GLUCOSE: CPT

## 2020-09-05 PROCEDURE — G1004 CDSM NDSC: HCPCS

## 2020-09-05 PROCEDURE — 97167 OT EVAL HIGH COMPLEX 60 MIN: CPT

## 2020-09-05 PROCEDURE — 83036 HEMOGLOBIN GLYCOSYLATED A1C: CPT | Performed by: NURSE PRACTITIONER

## 2020-09-05 PROCEDURE — 70551 MRI BRAIN STEM W/O DYE: CPT

## 2020-09-05 PROCEDURE — 80061 LIPID PANEL: CPT | Performed by: NURSE PRACTITIONER

## 2020-09-05 PROCEDURE — 70498 CT ANGIOGRAPHY NECK: CPT

## 2020-09-05 PROCEDURE — 84425 ASSAY OF VITAMIN B-1: CPT | Performed by: NURSE PRACTITIONER

## 2020-09-05 RX ORDER — CHLORAL HYDRATE 500 MG
1000 CAPSULE ORAL DAILY
Status: DISCONTINUED | OUTPATIENT
Start: 2020-09-05 | End: 2020-09-08 | Stop reason: HOSPADM

## 2020-09-05 RX ORDER — ASPIRIN 81 MG/1
81 TABLET, CHEWABLE ORAL DAILY
Status: DISCONTINUED | OUTPATIENT
Start: 2020-09-05 | End: 2020-09-07

## 2020-09-05 RX ORDER — ACETAMINOPHEN 325 MG/1
650 TABLET ORAL EVERY 6 HOURS PRN
Status: DISCONTINUED | OUTPATIENT
Start: 2020-09-05 | End: 2020-09-08 | Stop reason: HOSPADM

## 2020-09-05 RX ORDER — ATORVASTATIN CALCIUM 40 MG/1
40 TABLET, FILM COATED ORAL EVERY EVENING
Status: DISCONTINUED | OUTPATIENT
Start: 2020-09-05 | End: 2020-09-08 | Stop reason: HOSPADM

## 2020-09-05 RX ORDER — METOPROLOL SUCCINATE 50 MG/1
50 TABLET, EXTENDED RELEASE ORAL DAILY
Status: DISCONTINUED | OUTPATIENT
Start: 2020-09-05 | End: 2020-09-08 | Stop reason: HOSPADM

## 2020-09-05 RX ORDER — SODIUM CHLORIDE, SODIUM LACTATE, POTASSIUM CHLORIDE, CALCIUM CHLORIDE 600; 310; 30; 20 MG/100ML; MG/100ML; MG/100ML; MG/100ML
50 INJECTION, SOLUTION INTRAVENOUS CONTINUOUS
Status: DISPENSED | OUTPATIENT
Start: 2020-09-05 | End: 2020-09-05

## 2020-09-05 RX ADMIN — INSULIN LISPRO 2 UNITS: 100 INJECTION, SOLUTION INTRAVENOUS; SUBCUTANEOUS at 12:05

## 2020-09-05 RX ADMIN — ASPIRIN 81 MG 81 MG: 81 TABLET ORAL at 09:37

## 2020-09-05 RX ADMIN — INSULIN LISPRO 1 UNITS: 100 INJECTION, SOLUTION INTRAVENOUS; SUBCUTANEOUS at 21:32

## 2020-09-05 RX ADMIN — APIXABAN 2.5 MG: 2.5 TABLET, FILM COATED ORAL at 09:37

## 2020-09-05 RX ADMIN — Medication 1000 MG: at 09:37

## 2020-09-05 RX ADMIN — IOHEXOL 85 ML: 350 INJECTION, SOLUTION INTRAVENOUS at 15:40

## 2020-09-05 RX ADMIN — APIXABAN 2.5 MG: 2.5 TABLET, FILM COATED ORAL at 17:29

## 2020-09-05 RX ADMIN — ATORVASTATIN CALCIUM 40 MG: 40 TABLET, FILM COATED ORAL at 17:29

## 2020-09-05 RX ADMIN — ASPIRIN 81 MG 81 MG: 81 TABLET ORAL at 01:45

## 2020-09-05 RX ADMIN — B-COMPLEX W/ C & FOLIC ACID TAB 1 TABLET: TAB at 09:37

## 2020-09-05 RX ADMIN — SODIUM CHLORIDE, SODIUM LACTATE, POTASSIUM CHLORIDE, AND CALCIUM CHLORIDE 50 ML/HR: .6; .31; .03; .02 INJECTION, SOLUTION INTRAVENOUS at 01:49

## 2020-09-05 RX ADMIN — ATORVASTATIN CALCIUM 40 MG: 40 TABLET, FILM COATED ORAL at 01:45

## 2020-09-05 RX ADMIN — METOPROLOL SUCCINATE 50 MG: 50 TABLET, EXTENDED RELEASE ORAL at 09:37

## 2020-09-05 NOTE — H&P
H&P- Jose G Hutton 1934, 80 y o  male MRN: [id number]    Unit/Bed#: S -01 Encounter: [id number]    Primary Care Provider: Lisa Mendoza MD   Date and time admitted to hospital: 9/4/2020  7:29 PM        * Altered mental state  Assessment & Plan  · Presentation: pulled over by police due to erratic driving  Patient is unable to explain situation  Has slowed speech  Per patient's family, rapid onset of acute confusion for the past 2 weeks  Reports symptoms wax and wane  · Hypoglycemic with EMS, blood sugar 50s   · CT head (8/31): no acute intracranial abnormality  Age related changes  Sinus disease  · Coma panel negative   · Afebrile without leukocytosis   · Obtain UA   · Monitor accuchecks overnight   · Neuro checks   · Stroke pathway     Paroxysmal atrial fibrillation (HCC)  Assessment & Plan  · Rate controlled on metoprolol   · Anticoagulated on eliquis  Family reports no missed doses of eliquis, but patient manages his own medications independently  · Echo 2019:  LVEF 65%,  Mild mitral   Mild aortic regurg  Protein-calorie malnutrition ([address] )  Assessment & Plan  Malnutrition Findings:           BMI Findings: Body mass index is 19 37 kg/m²  · Malnutrition type: chronic illness, diabetes  · Mild muscle fat loss in clavicles, triceps, temporals  · Family reports 2 lb weight loss in one week as reported by PCP  · Liberalize diet     Acute kidney injury ([facility] )  Assessment & Plan   Creatinine upon admission: 1 52  o Baseline: 1 2   Secondary to decreased PO intake    Treatment: IV fluids   Monitor BMP  Type 2 diabetes mellitus with hyperglycemia, with long-term current use of insulin [facility])  Assessment & Plan  Lab Results   Component Value Date    HGBA1C 6 7 (H) 07/06/2020       Recent Labs     09/04/20  1936 09/04/20  2259 09/05/20  0102   POCGLU 83 93 177*       Blood Sugar Average: Last 72 hrs:  (P) 904 [id number]     · A1c 6 7    Hypoglycemic with EMS with blood sugar in 50s  Received D50 in ED  · Home regimen: metformin, glimepiride, lantus 10 units daily ---hold all  · Per chart review, patient has been on metformin, glimeperide, lantus 10 units since April 2019  · Frequent accucheck overnight         VTE Prophylaxis: Apixaban (Eliquis)  / sequential compression device   Code Status: level 1 - discussed with patients daughter   POLST: POLST is not applicable to this patient  Discussion with family: patient's daughter Analia Ortega by phone     Anticipated Length of Stay:  Patient will be admitted on an Inpatient basis with an anticipated length of stay of  Greater than 2 midnights  Justification for Hospital Stay: altered mental status     Total Time for Visit, including Counseling / Coordination of Care: 45 minutes  Greater than 50% of this total time spent on direct patient counseling and coordination of care  Chief Complaint:   altered mental status    History of Present Illness:    Betina Kapoor is a 80 y o  male with past medical history significant for type 2 diabetes, CKD, atrial fibrillation who presents with altered mental status  Patient was found driving erratically and was pulled over by police  He is unable to offer any other information  He reports feeling well  He is oriented to person  Per patient's daughter, Analia Ortega, new onset confusion occurred two weeks ago  She reports he was "staring off into space" and "wasn't hearing anything "  She reports these symptoms wax and wane  Seen by PCP one week ago and was told it was due to old age  Outpatient head CT negative for acute intracranial abnormality  Patient lives alone independently  Daughter lives in [place] but talks to him on the phone twice per day  Patient is responsible for administering his own medication  Daughter reports very poor PO intake  Admitted as inpatient  Neurology consulted  Stroke pathway       Review of Systems:    Review of Systems   Unable to perform ROS: Mental status change   All other systems reviewed and are negative  Past Medical and Surgical History:     Past Medical History:   Diagnosis Date    A-fib ([place] [address] )     CKD (chronic kidney disease) stage 3, GFR 30-59 ml/min ([facility])     Diabetes mellitus ([facility] )        History reviewed  No pertinent surgical history  Meds/Allergies:    Prior to Admission medications    Medication Sig Start Date End Date Taking? Authorizing Provider   apixaban (ELIQUIS) 2 5 mg Take 1 tablet (2 5 mg total) by mouth 2 (two) times a day 4/30/19   Billie Rodriguez PA-C   glimepiride (AMARYL) 4 mg tablet Take 4 mg by mouth every morning before breakfast    Historical Provider, MD   hydrOXYzine HCL (ATARAX) 25 mg tablet Take 25 mg by mouth every 6 (six) hours as needed for itching    Historical Provider, MD   insulin glargine (LANTUS) 100 units/mL subcutaneous injection Inject 10 Units under the skin daily before breakfast for 30 days 4/8/19 9/16/19  Gracy Elizondo PA-C   metFORMIN (GLUCOPHAGE) 1000 MG tablet Take 1,000 mg by mouth 2 (two) times a day with meals    Historical Provider, MD   metoprolol succinate (TOPROL-XL) 50 mg 24 hr tablet Take 1 tablet (50 mg total) by mouth daily 4/30/19   Billie Rodriguez PA-C   MULTIPLE VITAMIN PO Take by mouth    Historical Provider, MD   Omega-3 Fatty Acids (FISH OIL OMEGA-3 PO) Take by mouth    Historical Provider, MD     I have reviewed home medications with patient family member  Allergies: No Known Allergies    Social History:     Marital Status:    Patient Pre-hospital Living Situation: lives alone  Daughter 2 hrs away     Patient Pre-hospital Level of Mobility: ambulates     Substance Use History:   Social History     Substance and Sexual Activity   Alcohol Use Never    Frequency: Never     Social History     Tobacco Use   Smoking Status Never Smoker   Smokeless Tobacco Never Used     Social History     Substance and Sexual Activity   Drug Use Never       Family History:    non-contributory    Physical Exam:     Vitals:   Blood Pressure: 149/76 (09/04/20 2348)  Pulse: 65 (09/04/20 2305)  Temperature: 98 6 °F (37 °C) (09/04/20 2348)  Temp Source: Oral (09/04/20 2348)  Respirations: 18 (09/04/20 2348)  Weight - Scale: 56 1 kg (123 lb 10 9 oz) (09/04/20 2348)  SpO2: 98 % (09/04/20 2348)    Physical Exam  HENT:      Head: Normocephalic and atraumatic  Right Ear: External ear normal       Left Ear: External ear normal       Nose: Nose normal       Mouth/Throat:      Pharynx: Oropharynx is clear  Eyes:      Extraocular Movements: Extraocular movements intact  Pupils: Pupils are equal, round, and reactive to light  Neck:      Musculoskeletal: Normal range of motion and neck supple  Cardiovascular:      Rate and Rhythm: Normal rate and regular rhythm  Pulses: Normal pulses  Heart sounds: Normal heart sounds  No murmur  No gallop  Pulmonary:      Effort: Pulmonary effort is normal  No respiratory distress  Breath sounds: Normal breath sounds  No wheezing, rhonchi or rales  Abdominal:      General: Bowel sounds are normal  There is no distension  Palpations: Abdomen is soft  Tenderness: There is no abdominal tenderness  There is no guarding or rebound  Musculoskeletal: Normal range of motion  Skin:     General: Skin is warm and dry  Capillary Refill: Capillary refill takes less than 2 seconds  Neurological:      Mental Status: He is alert  GCS: GCS eye subscore is 4  GCS verbal subscore is 4  GCS motor subscore is 6  Cranial Nerves: No facial asymmetry  Sensory: Sensation is intact  Motor: Motor function is intact  Comments: Alert to self  Follows commands   Slowed speech    Psychiatric:         Mood and Affect: Mood normal              Additional Data:     Lab Results: I have personally reviewed pertinent reports        Results from last 7 days   Lab Units 09/04/20 2028   WBC Thousand/uL 7 14 HEMOGLOBIN g/dL 12 5   HEMATOCRIT % 38 4   PLATELETS Thousands/uL 166   NEUTROS PCT % 69   LYMPHS PCT % 23   MONOS PCT % 6   EOS PCT % 2     Results from last 7 days   Lab Units 09/04/20 2028   SODIUM mmol/L 142   POTASSIUM mmol/L 4 7   CHLORIDE mmol/L 107   CO2 mmol/L 32   BUN mg/dL 32*   CREATININE mg/dL 1 52*   ANION GAP mmol/L 3*   CALCIUM mg/dL 9 3   ALBUMIN g/dL 4 1   TOTAL BILIRUBIN mg/dL 0 39   ALK PHOS U/L 53   ALT U/L 27   AST U/L 18   GLUCOSE RANDOM mg/dL 86         Results from last 7 days   Lab Units 09/05/20  0102 09/04/20  2259 09/04/20  1936   POC GLUCOSE mg/dl 177* 93 83               Imaging: I have personally reviewed pertinent reports  MRI Inpatient Order    (Results Pending)       EKG, Pathology, and Other Studies Reviewed on Admission:   · EKG: sinus bradycardia, rate 57     Allscripts / Epic Records Reviewed: Yes     ** Please Note: This note has been constructed using a voice recognition system   **

## 2020-09-05 NOTE — ASSESSMENT & PLAN NOTE
· Rate controlled on metoprolol   · Anticoagulated on eliquis  Family reports no missed doses of eliquis, but patient manages his own medications independently  · Echo 2019:  LVEF 65%,  Mild mitral   Mild aortic regurg

## 2020-09-05 NOTE — OCCUPATIONAL THERAPY NOTE
Occupational Therapy Evaluation     Patient Name: Starlett Goldberg  LJUOX'[id number] Date: 9/5/2020  Problem List  Principal Problem:    Altered mental state  Active Problems:    Type 2 diabetes mellitus with hyperglycemia, with long-term current use of insulin (HCC)    Acute kidney injury ([facility] )    Protein-calorie malnutrition (HCC)    Paroxysmal atrial fibrillation (HCC)    Past Medical History  Past Medical History:   Diagnosis Date    A-fib ([place] [address] )     CKD (chronic kidney disease) stage 3, GFR 30-59 ml/min (HCC)     Diabetes mellitus ([facility] )      Past Surgical History  History reviewed  No pertinent surgical history  09/05/20 0825   Note Type   Note type Eval only   Restrictions/Precautions   Other Precautions Chair Alarm;Cognitive; Bed Alarm; Fall Risk;Hard of hearing   Pain Assessment   Pain Assessment Tool Pain Assessment not indicated - pt denies pain   Pain Score No Pain   Home Living   Type of Home House   Bathroom Shower/Tub Tub/shower unit   Bathroom Toilet Standard   Additional Comments Patient poor historian  Pt stated ambulates at home without AD  Was able to recall lives alone in a house could not recal any steps to enter   Recalled it is 1 level  Stated has a standard toilet and tub shower  Could not recall any DME in bathroom   Prior Function   Level of [place] Independent with ADLs and functional mobility   Lives With Alone   Receives Help From Family   ADL Assistance Independent   IADLs Independent   Falls in the last 6 months 0   ADL   Eating Assistance Unable to assess   Grooming Assistance 5  Supervision/Setup   Grooming Deficit Wash/dry hands; Wash/dry face; Supervision/safety;Verbal cueing  (standing)   UB Bathing Assistance 5  Supervision/Setup   UB Bathing Deficit Increased time to complete;Right arm; Abdomen   LB Bathing Assistance Unable to assess   UB Dressing Assistance 5  Supervision/Setup   UB Dressing Deficit Thread RUE; Thread LUE;Pull around back   LB Dressing Assistance 4  Minimal Assistance   LB Dressing Deficit Don/doff R sock; Don/doff L sock; Thread RLE into pants; Thread LLE into pants;Pull up over hips; Supervision/safety; Increased time to complete;Steadying   Toileting Assistance  4  Minimal Assistance   Toileting Deficit Increased time to complete;Supervison/safety   Bed Mobility   Supine to Sit 6  Modified independent   Transfers   Sit to Stand 4  Minimal assistance   Additional items Assist x 1; Increased time required;Verbal cues   Stand to Sit 4  Minimal assistance   Additional items Assist x 1; Increased time required;Verbal cues   Functional Mobility   Functional Mobility 4  Minimal assistance   Additional Comments Patient ambulated to bathrrom and back to recliner using IV pole  (Patient ambulated to bathrrom and back to recliner using IV )   Balance   Dynamic Sitting Fair +   Static Standing Fair +   Dynamic Standing [address],Flo[address] 3,4, & 5 -   Activity Tolerance   Activity Tolerance Patient tolerated treatment well   Nurse Made Aware Noel RICO Assessment   RUE Assessment WFL   LUE Assessment   LUE Assessment WFL   Hand Function   Gross Motor Coordination Functional   Fine Motor Coordination Functional   Sensation   Light Touch No apparent deficits   Cognition   Orientation Level Oriented to person;Disoriented to situation;Oriented to place; Disoriented to time   Following Commands Follows one step commands with increased time or repetition   Comments Pt was ID by wrist band name and    Cognition Assessment Tools MOCA   Score   (14/30 below normal range)   Assessment   Limitation Decreased ADL status; Decreased UE strength;Decreased cognition;Decreased Safe judgement during ADL;Decreased self-care trans;Decreased high-level ADLs   Prognosis Fair   Assessment Patient evaluated by Occupational Therapy  Patient admitted with Altered mental state    The patients occupational profile, medical and therapy history includes a expanded review of medical and/or therapy records and additional review of physical, cognitive, or psychosocial history related to current functional performance  Comorbidities affecting functional mobility and ADLS include: afib, CKD and diabetes  Prior to admission, patient was independent with functional mobility without assistive device, independent with ADLS, independent with IADLS and living alone in 1 story home with unkown steps to enter  Patient performed grroming, UB dressing, and UB bathing at Sup  Patient performed LB dressing and toileting at [address] [place] [address]  Patient perfored bed mobility at [facility] [place] I and transfers at [facility] A and Functional mobility at [address] [place] [address]  The evaluation identifies the following performance deficits: weakness, impaired balance, decreased endurance, decreased coordination, increased fall risk, new onset of impairment of functional mobility, decreased ADLS, decreased IADLS and decreased safety awareness, that result in activity limitations and/or participation restrictions  This evaluation requires clinical decision making of high complexity, The Barthel Index was used as a functional outcome tool presenting with a score of 60  Patient will benefit from skilled Occupational Therapy services to address above deficits and facilitate a safe return to prior level of function     Goals   Patient Goals " go home"   STG Time Frame   (1-7)   Short Term Goal #1 Patient will increase standing tolerance to 5 minutes during ADL task to decrease assistance level and decrease fall risk; Patient will increase functional mobility to and from bathroom with rolling walker with supervision to increase performance with ADLS and to use a toilet; Patient will tolerate 10 minutes of UE ROM/strengthening to increase general activity tolerance and performance in ADLS/IADLS; Patient will improve functional activity tolerance to 10 minutes of sustained functional tasks to increase participation in basic self-care and decrease assistance level; Patient will increase dynamic standing balance to fair to improve postural stability and decrease fall risk during standing ADLS and transfers  LTG Time Frame   (8-14)   Long Term Goal #1 Patient will increase standing tolerance to 8 minutes during ADL task to decrease assistance level and decrease fall risk; Patient will increase functional mobility to and from bathroom with rolling walker CGA to increase performance with ADLS and to use a toilet; Patient will tolerate 8 minutes of UE ROM/strengthening to increase general activity tolerance and performance in ADLS/IADLS; Patient will improve functional activity tolerance to 8 minutes of sustained functional tasks to increase participation in basic self-care and decrease assistance level; Patient will increase dynamic standing balance to fair + to improve postural stability and decrease fall risk during standing ADLS and transfers  Functional Transfer Goals   Pt Will Transfer To Bedside Commode With mod indep   Pt Will Transfer To Toilet With mod indep   Pt Will Transfer To Shower With mod indep   ADL Goals   Pt Will Perform Eating Independently   Pt Will Perform Grooming Independently   Pt Will Perform Bathing Independently   Pt Will Perform UE Dressing With mod indep   Pt Will Perform LE Dressing With mod indep   Pt Will Perform Toileting With mod indep   Plan   Treatment Interventions ADL retraining;Functional transfer training;UE strengthening/ROM; Endurance training;Neuromuscular reeducation;Continued evaluation; Activityengagement   Goal Expiration Date 09/19/20   OT Frequency 2-3x/wk   Recommendation   OT Discharge Recommendation Post-Acute Rehabilitation Services   Barthel Index   Feeding 10   Bathing 0   Grooming Score 0   Dressing Score 5   Bladder Score 10   Bowels Score 10   Toilet Use Score 5   Transfers (Bed/Chair) Score 10   Mobility (Level Surface) Score 10   Stairs Score 0   Barthel Index Score 60   Modified [place] Scale   Modified [place] Scale 4   Viridiana Winston OTR/L

## 2020-09-05 NOTE — ASSESSMENT & PLAN NOTE
· Presentation: pulled over by police due to erratic driving  Patient is unable to explain situation  Has slowed speech  Per patient's family, rapid onset of acute confusion for the past 2 weeks  Reports symptoms wax and wane  · Hypoglycemic with EMS, blood sugar 50s( setting of gliburide  · Brain MRI:  Acute occipital stroke    · Inpatient rehab by PT:   · Will keep Eliquis as patient was not taking at home and coumadin will be harder to monitor

## 2020-09-05 NOTE — ASSESSMENT & PLAN NOTE
· Baseline creatinine 1-1 2  · Now 1 5  · LR @ 50mL/hour overnight 2:2 to creatinine bump, dehydration

## 2020-09-05 NOTE — ASSESSMENT & PLAN NOTE
 Creatinine upon admission: 1 52  o Baseline: 1 2   Secondary to decreased PO intake    Treatment: IV fluids   Monitor BMP

## 2020-09-05 NOTE — ASSESSMENT & PLAN NOTE
Lab Results   Component Value Date    HGBA1C 6 7 (H) 07/06/2020       Recent Labs     09/04/20  1936 09/04/20  5040   POCGLU 83 93       Blood Sugar Average: Last 72 hrs:  (P) 88     · Most recent BGM 93, A1c this July 6 7  · Hold Glimepiride, Lantus  · Close blood glucose monitoring

## 2020-09-05 NOTE — PLAN OF CARE
Problem: OCCUPATIONAL THERAPY ADULT  Goal: Performs self-care activities at highest level of function for planned discharge setting  See evaluation for individualized goals  Description: Treatment Interventions: ADL retraining, Functional transfer training, UE strengthening/ROM, Endurance training, Neuromuscular reeducation, Continued evaluation, Activityengagement          See flowsheet documentation for full assessment, interventions and recommendations  Note: Limitation: Decreased ADL status, Decreased UE strength, Decreased cognition, Decreased Safe judgement during ADL, Decreased self-care trans, Decreased high-level ADLs  Prognosis: Fair  Assessment: Patient evaluated by Occupational Therapy  Patient admitted with Altered mental state  The patients occupational profile, medical and therapy history includes a expanded review of medical and/or therapy records and additional review of physical, cognitive, or psychosocial history related to current functional performance  Comorbidities affecting functional mobility and ADLS include: afib, CKD and diabetes  Prior to admission, patient was independent with functional mobility without assistive device, independent with ADLS, independent with IADLS and living alone in 1 story home with unkown steps to enter  Patient performed grroming, UB dressing, and UB bathing at [facility]  Patient performed LB dressing and toileting at [address] [place] [address]  Patient perfored bed mobility at [facility] and transfers at [facility] A and Functional mobility at [address] [place] [address]  The evaluation identifies the following performance deficits: weakness, impaired balance, decreased endurance, decreased coordination, increased fall risk, new onset of impairment of functional mobility, decreased ADLS, decreased IADLS and decreased safety awareness, that result in activity limitations and/or participation restrictions   This evaluation requires clinical decision making of high complexity The Barthel Index was used as a functional outcome tool presenting with a score of 60  Patient will benefit from skilled Occupational Therapy services to address above deficits and facilitate a safe return to prior level of function       OT Discharge Recommendation: Post-Acute Rehabilitation Services

## 2020-09-05 NOTE — UTILIZATION REVIEW
Initial Clinical Review    Admission: Date/Time/Statement:   Admission Orders (From admission, onward)     Ordered        09/04/20 2253  Inpatient Admission (expected length of stay for this patient Order details is greater than two midnights)  Once                   Orders Placed This Encounter   Procedures    Inpatient Admission (expected length of stay for this patient Order details is greater than two midnights)     Standing Status:   Standing     Number of Occurrences:   1     Order Specific Question:   Admitting Physician     Answer:   Orly Gallegos [[id number]]     Order Specific Question:   Level of Care     Answer:   Med Surg [[id number]]     Order Specific Question:   Estimated length of stay     Answer:   More than 2 Midnights     Order Specific Question:   Certification     Answer:   I certify that inpatient services are medically necessary for this patient for a duration of greater than two midnights  See H&P and MD Progress Notes for additional information about the patient's course of treatment  ED Arrival Information     Expected Arrival Acuity Means of Arrival Escorted By Service Admission Type    - 9/4/2020 19:29 Urgent Ambulance [facility] Hospitalist Urgent    Arrival Complaint    AMS        Chief Complaint   Patient presents with    Hypoglycemia - Symptomatic     pt was pulled over for erratic driving  police stated that patient was altered, per EMS pt blood sugar was 56 on arrival  after he was given 100 ml of D50 pt blood sugar was 110     Assessment/Plan: 81 yo male presents to Ed via EMS after pulled over by police for erratic driving and found to have altered LOC  Per family, over past two weeks episode of rapid onset of acute confusion  He was evaluated by PCP  A CT head 8/31 showed no acute abnormalities  Blood glucose 50's per EMS pre-hospital  Pt is unable to explain situation  Speech is slowed  PMH T2DM   Pt received D50 in ED Pt has been on metformin, glimeperide and lantus 10 u since April 2019  Hgb A1c 6 7  RUTHY noted with Creatinine increased to  1 52 from baseline 1 2  BMI 19, family report 2 lb weight loss in 1 week  Carlton Erwin PAF rate controlled on Metoprolol and anticoagulated on Eliquis  Exam notes pt is alert to self, follows commands, slowed speech  GCS 14  Motor function intact, no facial asymmetry EKG SB Admitted as Inpatient to med [facility] for evaluation treatment of altered mental status  Frequest accuchecks monitoring, hold all diabetes meds  IV fluids initiated, monitor BMP  Liberalize diet Neuro checks, Stroke pathway  Check UA  Consult neurology      Neurology 9/5 MRI brain demonstrates small early infarcts within left occipital lobe  Due to infarction while on Eliquis recommend transition to Coumadin  Can do an ASA bridge to Coumadin  Continue frequemt neuro checks, notifu neuro of any changes  Allow permissive HTN  Labetalol if SBP > 200  Neuro exam : equal strength 5/5 a;; extremities  Pupils are equal, round, and reactive to light  Unable to identify the month or year (stated that it was April 2000)   Able to spell the word "world" forward but unable to spell it backwards    ED Triage Vitals   Temperature Pulse Respirations Blood Pressure SpO2   09/1934 09/1934 09/04/20 1932 09/04/20 1932 09/1934   98 °F (36 7 °C) 71 18 (!) 158/105 98 %      Temp Source Heart Rate Source Patient Position - Orthostatic VS BP Location FiO2 (%)   09/1934 09/04/20 1932 09/04/20 2305 09/04/20 2305 --   Oral Monitor Lying Right arm       Pain Score       09/1934       No Pain          Wt Readings from Last 1 Encounters:   09/05/20 53 7 kg (118 lb 6 4 oz)     Additional Vital Signs:   Date/Time   Temp   Pulse   Resp   BP   SpO2      09/05/20 1457   98 6 °F (37 °C)   61   18   123/59   99 %      09/05/20 1300   98 7 °F (37 1 °C)   76   19   140/78   98 %      09/05/20 1100   98 7 °F (37 1 °C)   76   18   142/75   98 %      09/05/20 0920   98 6 °F (37 °C)   65   18 140/75         20 0700   98 6 °F (37 °C)   62   18   138/75   98 %      20 0500   98 5 °F (36 9 °C)   60   18   129/75   98 %      20 0400   98 9 °F (37 2 °C)   63   18   132/85   99 %            Pertinent Labs/Diagnostic Test Results:      CTA head neck       CT brain: No acute intracranial abnormality      CT angiography: Minor atherosclerotic change of the cervical vasculature      Intracranial vasculature demonstrates mild atherosclerotic change of the intracranial internal carotid arteries  There is moderate focal stenosis of the left P2 segment of the posterior cerebral artery    This may account for the patient's recently   diagnosed small left occipital infarcts      No occlusive disease or thrombosis       MRI brain    Small early infarcts identified within the left occipital lobe without mass effect or hemorrhagic transformation      Moderate periventricular white matter change consistent with chronic microangiopathic change    Chronic sinus disease       EK/4 sinus bradycardia, rate 57       Results from last 7 days   Lab Units 20  0438 20  2124   WBC Thousand/uL 6 34 7 14 6 57   HEMOGLOBIN g/dL 11 1* 12 5 13 0   HEMATOCRIT % 34 2* 38 4 41 3   PLATELETS Thousands/uL 142* 166 199   NEUTROS ABS Thousands/µL 3 05 4 88 3 15         Results from last 7 days   Lab Units 20   SODIUM mmol/L 141 142   POTASSIUM mmol/L 4 6 4 7   CHLORIDE mmol/L 107 107   CO2 mmol/L 30 32   ANION GAP mmol/L 4 3*   BUN mg/dL 27* 32*   CREATININE mg/dL 1 38* 1 52*   EGFR ml/min/1 73sq m 46 41   CALCIUM mg/dL 8 4 9 3   MAGNESIUM mg/dL  --  2 0     Results from last 7 days   Lab Units 20   AST U/L 18   ALT U/L 27   ALK PHOS U/L 53   TOTAL PROTEIN g/dL 7 5   ALBUMIN g/dL 4 1   TOTAL BILIRUBIN mg/dL 0 39     Results from last 7 days   Lab Units 20  1554 20  1121 20  3279 20  0320 20  0102 20  2648 09/04/20  1936   POC GLUCOSE mg/dl 123 223* 135 165* 177* 93 83     Results from last 7 days   Lab Units 09/05/20  0438 09/04/20 2028   GLUCOSE RANDOM mg/dL 187* 86               Results from last 7 days   Lab Units 09/04/20 2028 08/31/20  2124   TSH 3RD GENERATON uIU/mL 3 086 3 440       Results from last 7 days   Lab Units 09/05/20  0124   CLARITY UA  Clear   COLOR UA  Yellow   SPEC GRAV UA  1 015   PH UA  7 0   GLUCOSE UA mg/dl Negative   KETONES UA mg/dl Negative   BLOOD UA  Negative   PROTEIN UA mg/dl Negative   NITRITE UA  Negative   BILIRUBIN UA  Negative   UROBILINOGEN UA E U /dl 0 2   LEUKOCYTES UA  Negative         Results from last 7 days   Lab Units 09/04/20  2110   ETHANOL LVL mg/dL <3   ACETAMINOPHEN LVL ug/mL <2*   SALICYLATE LVL mg/dL <3*     ED Treatment:   Medication Administration from 09/04/2020 1929 to 09/05/2020 0008       Date/Time Order Dose Route Action Action by Comments     09/04/2020 2135 sodium chloride 0 9 % bolus 1,000 mL 1,000 mL Intravenous [facility]         Past Medical History:   Diagnosis Date    A-fib [facility])     CKD (chronic kidney disease) stage 3, GFR 30-59 ml/min ([facility])     Diabetes mellitus ([facility] )      Present on Admission:   Altered mental state   Paroxysmal atrial fibrillation ([facility])   Protein-calorie malnutrition ([facility] )      Admitting Diagnosis: Altered mental status [R41 82]  Altered mental state [R41 82]  Hypoglycemia [E16 2]  Age/Sex: 80 y o  male  Admission Orders:  Scheduled Medications:  apixaban, 2 5 mg, Oral, BID  aspirin, 81 mg, Oral, Daily  atorvastatin, 40 mg, Oral, QPM  fish oil, 1,000 mg, Oral, Daily  insulin lispro, 1-5 Units, Subcutaneous, TID AC  insulin lispro, 1-5 Units, Subcutaneous, HS  metoprolol succinate, 50 mg, Oral, Daily  multivitamin stress formula, 1 tablet, Oral, Daily      Continuous IV Infusions:     PRN Meds:  acetaminophen, 650 mg, Oral, Q6H PRN    Accu check qac and HS  Regular diet, supplement Ensure BID   Seizure precautions  Daily weights   I/O   CRP  Telemetry   SCD   Neuro checks q1x4, q2h x 8h, q4h x 72 hr      ECHO    IP CONSULT TO NEUROLOGY  IP CONSULT TO CASE MANAGEMENT  IP CONSULT TO NUTRITION SERVICES    Network Utilization Review Department  [email or web address]  ATTENTION: Please call with any questions or concerns to [phone] and carefully listen to the prompts so that you are directed to the right person  All voicemails are confidential   Torsten Snowden all requests for admission clinical reviews, approved or denied determinations and any other requests to dedicated fax number below belonging to the campus where the patient is receiving treatment   List of dedicated fax numbers for the Facilities:  [address] DENIALS (Administrative/Medical Necessity) [phone]   [address] (Maternity/NICU/Pediatrics) [phone]   Heber Pittman [phone]   Violeta Barros [phone]   [facility] [phone]   145 [facility]  [phone]   [address] [place] [address] [phone]   [facility]  [phone]   [address] [facility] [address] [facility]  [address] [facility] 1000 [address] [facility] [phone]

## 2020-09-05 NOTE — MALNUTRITION/BMI
This medical record reflects one or more clinical indicators suggestive of malnutrition and/or morbid obesity  Malnutrition Findings:   Malnutrition type: Chronic illness  Degree of Malnutrition: Malnutrition of mild degree(related to inadequate intake/advanced age as evidenced by intake inadequate to maintain a healthy wt (BMI 18 5, pt 80% of IBW), muscle depletion noted (clavicles, temples)  Treatment - supplement )  Malnutrition Characteristics: Muscle loss, Inadequate energy    See Nutrition note dated 9/5/20 for additional details  Completed nutrition assessment is viewable in the nutrition documentation

## 2020-09-05 NOTE — ASSESSMENT & PLAN NOTE
· On Eliquis 2 5 mg BID at baseline  · Follows with Cardiology outpatient  · Due to infarction while on Eliquis, recommend transitioning to Coumadin    · Medical management per primary team

## 2020-09-05 NOTE — ASSESSMENT & PLAN NOTE
Lab Results   Component Value Date    HGBA1C 6 7 (H) 07/06/2020       Recent Labs     09/04/20  1936 09/04/20  6504   POCGLU 83 93       Blood Sugar Average: Last 72 hrs:  (P) 88     · A1c 6 7  Hypoglycemic with EMS with blood sugar in 50s  Received D50 in ED  · Home regimen: metformin, glimepiride, lantus 10 units daily ---hold all  · Per chart review, patient has been on metformin, glimeperide, lantus 10 units since April 2019      · Frequent accucheck overnight

## 2020-09-05 NOTE — ED PROVIDER NOTES
History  Chief Complaint   Patient presents with    Hypoglycemia - Symptomatic     pt was pulled over for erratic driving  police stated that patient was altered, per EMS pt blood sugar was 56 on arrival  after he was given 100 ml of D50 pt blood sugar was 110       History provided by:  Patient and EMS personnel   used: No    81 y/o male brought by EMS after apparently being pulled over for driving erratically  Mild hypoglycemia  Was given D50 prehospital  FS normal on arrival here  Patient unable to provide concise history  He is disoriented to time  Speech is clear but slow  He has difficulty completing thoughts  He tells me that he got up at [address] [place] [address] [employer] with a friend until noon  Apparently was driving home  There is a large gap of time 7-8 hours he cannot account for today  He has IDDM, uses lantus in the morning  Given mild hypoglycemia and long-acting insulin, will need glucose monitoring  Patient had labs and head CT a few days ago in due to recent mental status changes  Prior to Admission Medications   Prescriptions Last Dose Informant Patient Reported? Taking?    MULTIPLE VITAMIN PO  Self Yes No   Sig: Take by mouth   Omega-3 Fatty Acids (FISH OIL OMEGA-3 PO)  Self Yes No   Sig: Take by mouth   apixaban (ELIQUIS) 2 5 mg  Self No No   Sig: Take 1 tablet (2 5 mg total) by mouth 2 (two) times a day   glimepiride (AMARYL) 4 mg tablet  Self Yes No   Sig: Take 4 mg by mouth every morning before breakfast   hydrOXYzine HCL (ATARAX) 25 mg tablet  Self Yes No   Sig: Take 25 mg by mouth every 6 (six) hours as needed for itching   insulin glargine (LANTUS) 100 units/mL subcutaneous injection  Self No No   Sig: Inject 10 Units under the skin daily before breakfast for 30 days   metFORMIN (GLUCOPHAGE) 1000 MG tablet  Self Yes No   Sig: Take 1,000 mg by mouth 2 (two) times a day with meals   metoprolol succinate (TOPROL-XL) 50 mg 24 hr tablet  Self No No   Sig: Take 1 tablet (50 mg total) by mouth daily      Facility-Administered Medications: None       Past Medical History:   Diagnosis Date    A-fib ([place] [address] )     CKD (chronic kidney disease) stage 3, GFR 30-59 ml/min ([facility])     Diabetes mellitus ([place] [address] )        History reviewed  No pertinent surgical history  History reviewed  No pertinent family history  I have reviewed and agree with the history as documented  E-Cigarette/Vaping     E-Cigarette/Vaping Substances     Social History     Tobacco Use    Smoking status: Never Smoker    Smokeless tobacco: Never Used   Substance Use Topics    Alcohol use: Never     Frequency: Never    Drug use: Never       Review of Systems   Constitutional: Negative for activity change, appetite change and fever  Respiratory: Negative for chest tightness and shortness of breath  Cardiovascular: Negative for chest pain  Gastrointestinal: Negative for abdominal pain  Musculoskeletal: Negative for back pain  Skin: Negative for wound  Neurological: Negative for weakness and headaches  Psychiatric/Behavioral: Positive for confusion  All other systems reviewed and are negative  Physical Exam  Physical Exam  Vitals signs and nursing note reviewed  Constitutional:       Appearance: Normal appearance  HENT:      Head: Normocephalic and atraumatic  Eyes:      Extraocular Movements: Extraocular movements intact  Pupils: Pupils are equal, round, and reactive to light  Neck:      Musculoskeletal: Normal range of motion and neck supple  Cardiovascular:      Rate and Rhythm: Normal rate and regular rhythm  Pulmonary:      Effort: Pulmonary effort is normal       Breath sounds: Normal breath sounds  Abdominal:      Palpations: Abdomen is soft  Tenderness: There is no abdominal tenderness  Musculoskeletal: Normal range of motion  General: No tenderness  Skin:     General: Skin is warm and dry  Neurological:      General: No focal deficit present        Mental Status: He is alert  Cranial Nerves: No cranial nerve deficit  Motor: No weakness  Coordination: Coordination normal       Gait: Gait normal       Comments: Disoriented to time  Poor memory     Psychiatric:         Mood and Affect: Mood normal          Behavior: Behavior normal          Vital Signs  ED Triage Vitals   Temperature Pulse Respirations Blood Pressure SpO2   09/1934 09/1934 09/04/20 1932 09/04/20 1932 09/1934   98 °F (36 7 °C) 71 18 (!) 158/105 98 %      Temp Source Heart Rate Source Patient Position - Orthostatic VS BP Location FiO2 (%)   09/1934 09/04/20 1932 09/04/20 2305 09/04/20 2305 --   Oral Monitor Lying Right arm       Pain Score       09/1934       No Pain           Vitals:    09/06/20 1449 09/06/20 1900 09/06/20 2227 09/07/20 0700   BP: 103/54 105/60 118/71 137/64   Pulse: 64 62 62 (!) 54   Patient Position - Orthostatic VS: Sitting  Sitting Lying         Visual Acuity  Visual Acuity      Most Recent Value   L Pupil Size (mm)  3   R Pupil Size (mm)  3   L Pupil Shape  Round   R Pupil Shape  Round          ED Medications  Medications   apixaban (ELIQUIS) tablet 2 5 mg (2 5 mg Oral Given 9/7/20 0823)   multivitamin stress formula tablet 1 tablet (1 tablet Oral Given 9/7/20 0823)   fish oil capsule 1,000 mg (1,000 mg Oral Given 9/7/20 0823)   metoprolol succinate (TOPROL-XL) 24 hr tablet 50 mg (50 mg Oral Given 9/7/20 0823)   lactated ringers infusion (0 mL/hr Intravenous Stopped 9/6/20 0641)   acetaminophen (TYLENOL) tablet 650 mg (has no administration in time range)   atorvastatin (LIPITOR) tablet 40 mg (40 mg Oral Given 9/6/20 1818)   aspirin chewable tablet 81 mg (81 mg Oral Given 9/7/20 0823)   insulin lispro (HumaLOG) 100 units/mL subcutaneous injection 1-5 Units (1 Units Subcutaneous Not Given 9/7/20 0823)   insulin lispro (HumaLOG) 100 units/mL subcutaneous injection 1-5 Units ( Subcutaneous Canceled Entry 9/6/20 2102)   sodium chloride 0 9 % bolus 1,000 mL (1,000 mL Intravenous New Bag 9/4/20 2135)   iohexol (OMNIPAQUE) 350 MG/ML injection (MULTI-DOSE) 85 mL (85 mL Intravenous Given 9/5/20 1540)       Diagnostic Studies  Results Reviewed     Procedure Component Value Units Date/Time    Folate [[id number]]  (Abnormal) Collected:  09/04/20 2028    Lab Status:  Final result Specimen:  Blood from Arm, Left Updated:  09/05/20 0558     Folate >20 0 ng/mL     Vitamin B12 [[id number]]  (Normal) Collected:  09/04/20 2028    Lab Status:  Final result Specimen:  Blood from Arm, Left Updated:  09/05/20 0558     Vitamin B-12 567 pg/mL     UA (URINE) with reflex to Scope [[id number]] Collected:  09/05/20 0124    Lab Status:  Final result Specimen:  Urine, Clean Catch Updated:  09/05/20 0133     Color, UA Yellow     Clarity, UA Clear     Specific Gravity, UA 1 015     pH, UA 7 0     Leukocytes, UA Negative     Nitrite, UA Negative     Protein, UA Negative mg/dl      Glucose, UA Negative mg/dl      Ketones, UA Negative mg/dl      Urobilinogen, UA 0 2 E U /dl      Bilirubin, UA Negative     Blood, UA Negative    Fingerstick Glucose (POCT) [[id number]]  (Normal) Collected:  09/04/20 2259    Lab Status:  Final result Updated:  09/04/20 2300     POC Glucose 93 mg/dl     TSH [[id number]]  (Normal) Collected:  09/04/20 2028    Lab Status:  Final result Specimen:  Blood from Arm, Left Updated:  09/04/20 2204     TSH 3RD GENERATON 3 086 uIU/mL     Narrative:       Patients undergoing fluorescein dye angiography may retain small amounts of fluorescein in the body for 48-72 hours post procedure  Samples containing fluorescein can produce falsely depressed TSH values  If the patient had this procedure,a specimen should be resubmitted post fluorescein clearance        Magnesium [[id number]]  (Normal) Collected:  09/04/20 2028    Lab Status:  Final result Specimen:  Blood from Arm, Left Updated:  09/04/20 2204     Magnesium 2 0 mg/dL     Ethanol [[id number]]  (Normal) Collected:  09/04/20 2110    Lab Status:  Final result Specimen:  Blood from Arm, Left Updated:  09/04/20 2157     Ethanol Lvl <3 mg/dL     Salicylate level [[id number]]  (Abnormal) Collected:  09/04/20 2110    Lab Status:  Final result Specimen:  Blood from Arm, Left Updated:  59/24/82 4278     Salicylate Lvl <3 mg/dL     Acetaminophen level-If concentration is detectable, please discuss with medical  on call   [[id number]]  (Abnormal) Collected:  09/04/20 2110    Lab Status:  Final result Specimen:  Blood from Arm, Left Updated:  09/04/20 2145     Acetaminophen Level <2 ug/mL     Comprehensive metabolic panel [[id number]]  (Abnormal) Collected:  09/04/20 2028    Lab Status:  Final result Specimen:  Blood from Arm, Left Updated:  09/04/20 2107     Sodium 142 mmol/L      Potassium 4 7 mmol/L      Chloride 107 mmol/L      CO2 32 mmol/L      ANION GAP 3 mmol/L      BUN 32 mg/dL      Creatinine 1 52 mg/dL      Glucose 86 mg/dL      Calcium 9 3 mg/dL      AST 18 U/L      ALT 27 U/L      Alkaline Phosphatase 53 U/L      Total Protein 7 5 g/dL      Albumin 4 1 g/dL      Total Bilirubin 0 39 mg/dL      eGFR 41 ml/min/1 73sq m     Narrative:       [place] guidelines for Chronic Kidney Disease (CKD):     Stage 1 with normal or high GFR (GFR > 90 mL/min/1 73 square meters)    Stage 2 Mild CKD (GFR = 60-89 mL/min/1 73 square meters)    Stage 3A Moderate CKD (GFR = 45-59 mL/min/1 73 square meters)    Stage 3B Moderate CKD (GFR = 30-44 mL/min/1 73 square meters)    Stage 4 Severe CKD (GFR = 15-29 mL/min/1 73 square meters)    Stage 5 End Stage CKD (GFR <15 mL/min/1 73 square meters)  Note: GFR calculation is accurate only with a steady state creatinine    CBC and differential [[id number]] Collected:  09/04/20 2028    Lab Status:  Final result Specimen:  Blood from Arm, Left Updated:  09/04/20 2049     WBC 7 14 Thousand/uL      RBC 4 11 Million/uL      Hemoglobin 12 5 g/dL      Hematocrit 38 4 %      MCV 93 fL MCH 30 4 pg      MCHC 32 6 g/dL      RDW 12 1 %      MPV 9 7 fL      Platelets 612 Thousands/uL      nRBC 0 /100 WBCs      Neutrophils Relative 69 %      Immat GRANS % 0 %      Lymphocytes Relative 23 %      Monocytes Relative 6 %      Eosinophils Relative 2 %      Basophils Relative 0 %      Neutrophils Absolute 4 88 Thousands/µL      Immature Grans Absolute 0 01 Thousand/uL      Lymphocytes Absolute 1 64 Thousands/µL      Monocytes Absolute 0 46 Thousand/µL      Eosinophils Absolute 0 12 Thousand/µL      Basophils Absolute 0 03 Thousands/µL     Fingerstick Glucose (POCT) [[id number]]  (Normal) Collected:  09/04/20 1936    Lab Status:  Final result Updated:  09/04/20 1939     POC Glucose 83 mg/dl                  CTA head and neck w wo contrast   Final Result by Akbar Fischer DO (09/05 1609)      CT brain: No acute intracranial abnormality  CT angiography: Minor atherosclerotic change of the cervical vasculature  Intracranial vasculature demonstrates mild atherosclerotic change of the intracranial internal carotid arteries  There is moderate focal stenosis of the left P2 segment of the posterior cerebral artery  This may account for the patient's recently    diagnosed small left occipital infarcts  No occlusive disease or thrombosis  Workstation performed: [id number]         MRI brain wo contrast   Final Result by Akbar Fischer DO (09/05 1037)      Small early infarcts identified within the left occipital lobe without mass effect or hemorrhagic transformation  Moderate periventricular white matter change consistent with chronic microangiopathic change  Chronic sinus disease  Workstation performed: [id number]                    Procedures  Procedures         ED Course  ED Course as of Sep 07 1037   Renata Wagner Sep 04, 2020   2106 Discussed with the patient's daughter via phone  She is on her way here    Reports that these mental status changes are fairly acute within the last few weeks  Patient had an outpatient head CT 3 days ago which showed some generalized atrophy no acute changes  MDM  Number of Diagnoses or Management Options  Altered mental state: new and requires workup  Hypoglycemia: new and requires workup  Diagnosis management comments: 60-year-old male, new onset dementia symptoms over the last 2 weeks  Very off from baseline per family  He was pulled over by police today for erratic driving  He is not able to fully explain the situation  Speech is very slow  Daughter reports she was with him 2 weeks ago when the onset of symptoms seemed to happen  He had an outpatient CT a few days ago showed age-related atrophy but nothing acute  He has mild RUTHY  Was hypoglycemic for EMS at 64  Was initially given D50 and eat some food here in the ED  He takes Lantus  Admitting for glucose monitoring, Neurology evaluation and possible placement  Amount and/or Complexity of Data Reviewed  Clinical lab tests: ordered and reviewed  Tests in the radiology section of CPT®: reviewed  Obtain history from someone other than the patient: yes  Discuss the patient with other providers: yes  Independent visualization of images, tracings, or specimens: yes    Patient Progress  Patient progress: stable        Disposition  Final diagnoses:   Hypoglycemia   Altered mental state     Time reflects when diagnosis was documented in both MDM as applicable and the Disposition within this note     Time User Action Codes Description Comment    9/4/2020 10:53 PM Vanessa LARSON Add [E16 2] Hypoglycemia     9/4/2020 10:53 PM Kenney Solis Add [R41 82] Altered mental state       ED Disposition     ED Disposition Condition Date/Time Comment    Admit Stable Fri Sep 4, 2020 10:53 PM Case was discussed with Ezell Paget and the patient's admission status was agreed to be Admission Status: inpatient status to the service of Dr Karan Ryder   Follow-up Information     Follow up With Specialties Details Why Contact Info Additional Information    [facility] Follow up in 4 week(s) Please follow up with Neurology in 4 weeks  The  will call you to set up an appointment  If you do not hear from the  in 1 week, please call the number above to set up an appointment  [address] [place] [facility] [address] [facility], [address] [facility] Lalo Capone [address] [place] [address], [address] [place] [address]          Current Discharge Medication List      CONTINUE these medications which have NOT CHANGED    Details   apixaban (ELIQUIS) 2 5 mg Take 1 tablet (2 5 mg total) by mouth 2 (two) times a day  Qty: 60 tablet, Refills: 3    Associated Diagnoses: Atrial fibrillation with RVR (HCC)      glimepiride (AMARYL) 4 mg tablet Take 4 mg by mouth every morning before breakfast      hydrOXYzine HCL (ATARAX) 25 mg tablet Take 25 mg by mouth every 6 (six) hours as needed for itching      insulin glargine (LANTUS) 100 units/mL subcutaneous injection Inject 10 Units under the skin daily before breakfast for 30 days  Qty: 300 Units, Refills: 0    Associated Diagnoses: Type 2 diabetes mellitus with hyperglycemia, without long-term current use of insulin (HCC)      metFORMIN (GLUCOPHAGE) 1000 MG tablet Take 1,000 mg by mouth 2 (two) times a day with meals      metoprolol succinate (TOPROL-XL) 50 mg 24 hr tablet Take 1 tablet (50 mg total) by mouth daily  Qty: 90 tablet, Refills: 3    Associated Diagnoses: Atrial fibrillation with RVR (HCC)      MULTIPLE VITAMIN PO Take by mouth      Omega-3 Fatty Acids (FISH OIL OMEGA-3 PO) Take by mouth           No discharge procedures on file      PDMP Review     None          ED Provider  Electronically Signed by           Mine Harrell MD  09/07/20 1037

## 2020-09-05 NOTE — ASSESSMENT & PLAN NOTE
Malnutrition Findings:           BMI Findings: Body mass index is 19 37 kg/m²  · Malnutrition type: chronic illness, diabetes     · Mild muscle fat loss in clavicles, triceps, temporals  · Family reports 2 lb weight loss in one week as reported by PCP  · Liberalize diet

## 2020-09-05 NOTE — ASSESSMENT & PLAN NOTE
· Presented to ED post erratic driving  · Memory loss, disoriented to situation, time, place   Slow verbal responses  · Head CT 8/30, age related changes  · Send urine, B12, thiamine levels  · Close blood glucose monitoring, hold Lantus, Glimepiride  · Consider MRI

## 2020-09-05 NOTE — PROGRESS NOTES
After talking with the patient and the daughter of the patient, The Pt admitted he was only taking his Eliquis once a day in the morning  He felt as though he only needed it once a day and it would work the same as if he was taking it twice a day  Gave patient education of the importance of taking medications the way they are prescribed  Made SLIM aware of issue with his Eliquis

## 2020-09-05 NOTE — ASSESSMENT & PLAN NOTE
Lab Results   Component Value Date    HGBA1C 6 7 (H) 07/06/2020       Recent Labs     09/04/20  2259 09/05/20  0102 09/05/20  0320 09/05/20  0649   POCGLU 93 177* 165* 135       Blood Sugar Average: Last 72 hrs:  (P) 130 6       · Medical management per primary team

## 2020-09-05 NOTE — ASSESSMENT & PLAN NOTE
Assessment:  · CT head without acute intracranial abnormality, demonstrated mild generalized atrophy and cerebral chronic microangiopathy  · MRI brain demonstrated small early infarcts within the left occipital lobe and chronic microangiopathic changes  Will check CTA head and neck for further evaluation  · Vitamin B12 level 567; folate level > 20 0  · UA negative  · Lipid panel: cholesterol- 149, triglycerides- 102, HDL- 29, LDL- 100  · On Eliquis 2 5 mg BID at baseline  Started on aspirin 81 mg and atorvastatin 40 mg this admission  · Due to infarction while on Eliquis, recommend patient be transitioned to Coumadin; can do aspirin bridge to Coumadin  · BP on presentation 158/105  Plan:  · Thiamine level pending  - Stroke pathway  Echo  CTA head and neck  Hemoglobin A1c  Recommend transitioning from Eliquis to Coumadin; can do aspirin bridge to Coumadin  Aspirin 81 mg  Atorvastatin 40 mg  Permissive HTN, labetalol if SBP >200  Continue telemetry  PT/OT/ST  Frequent neuro checks  Continue to monitor and notify neurology with any changes  · Medical management and supportive care per primary team  Correction of any metabolic or infectious disturbances

## 2020-09-05 NOTE — ASSESSMENT & PLAN NOTE
Lab Results   Component Value Date    HGBA1C 6 7 (H) 07/06/2020       Recent Labs     09/04/20  1936 09/04/20  2259 09/05/20  0102   POCGLU 83 93 177*       Blood Sugar Average: Last 72 hrs:  (P) 515 [id number]     · A1c 6 7  Hypoglycemic with EMS with blood sugar in 50s  Received D50 in ED  · Home regimen: metformin, glimepiride, lantus 10 units daily ---hold all  · Per chart review, patient has been on metformin, glimeperide, lantus 10 units since April 2019      · Frequent accucheck overnight   · At discharge discontineu Glimepiride( no safe and HA1C low for age )

## 2020-09-05 NOTE — ASSESSMENT & PLAN NOTE
· On Eliquis 2 5mg BID, metoprolol succinate 50mg daily  · Follows with [facility], Dr Quintero Sessions  · 65% EF, last ECHO 4/2019  · Continue home meds

## 2020-09-05 NOTE — ASSESSMENT & PLAN NOTE
· Presentation: pulled over by police due to erratic driving  Patient is unable to explain situation  Has slowed speech  Per patient's family, acute onset of confusion for the past 2 weeks  Reports symptoms wax and wane  · Hypoglycemic with EMS, blood sugar 50s   · CT head (8/31): no acute intracranial abnormality  Age related changes  Sinus disease      · Coma panel negative   · Afebrile without leukocytosis   · Obtain UA   · Monitor accuchecks overnight   · Neuro checks

## 2020-09-05 NOTE — CONSULTS
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 and chronic microangiopathic changes  Will check CTA head and neck for further evaluation  · Vitamin B12 level 567; folate level > 20 0  · UA negative  · Lipid panel: cholesterol- 149, triglycerides- 102, HDL- 29, LDL- 100  · On Eliquis 2 5 mg BID at baseline  Started on aspirin 81 mg and atorvastatin 40 mg this admission  · Due to infarction while on Eliquis, recommend patient be transitioned to Coumadin; can do aspirin bridge to Coumadin  · BP on presentation 158/105  Plan:  · Thiamine level pending  - Stroke pathway  Echo  CTA head and neck  Hemoglobin A1c  Recommend transitioning from Eliquis to Coumadin; can do aspirin bridge to Coumadin  Aspirin 81 mg  Atorvastatin 40 mg  Permissive HTN, labetalol if SBP >200  Continue telemetry  PT/OT/ST  Frequent neuro checks  Continue to monitor and notify neurology with any changes  · Medical management and supportive care per primary team  Correction of any metabolic or infectious disturbances  Paroxysmal atrial fibrillation (HCC)  Assessment & Plan  · On Eliquis 2 5 mg BID at baseline  · Follows with Cardiology outpatient  · Due to infarction while on Eliquis, recommend transitioning to Coumadin    · Medical management per primary team    Type 2 diabetes mellitus with hyperglycemia, with long-term current use of insulin [facility])  Assessment & Plan  Lab Results   Component Value Date    HGBA1C 6 7 (H) 07/06/2020 Recent Labs     09/04/20  2259 09/05/20  0102 09/05/20  0320 09/05/20  0649   POCGLU 93 177* 165* 135       Blood Sugar Average: Last 72 hrs:  (P) 130 6       · Medical management per primary team    Acute kidney injury [facility])  Assessment & Plan  · Creatinine on presentation 1 52  Most recent level 1 38   · Medical management per primary team      Dmoenico Luis will need follow up in in 4 weeks with neurovascular attending  He will not require outpatient neurological testing  Case and treatment plan reviewed with attending neurologist, Dr Jose Angel Doherty  History of Present Illness     Reason for Consult / Principal Problem: altered mental status  Hx and PE limited by: Torrie historian  HPI: Domenico Luis is a 80 y o   male with diabetes, CKD stage 3, Afib on Eliquis, who presents with altered mental status  Per H&P review, patient was found driving erratically by the police  He was noted to be oriented to self  Per patient's daughter, patient has been having new onset of confusion that started approximately 2 weeks ago  He was noted to have episodes of "staring off into space" and "wasn't hearing anything"  These episodes seem to wax and wane  He was evaluated by his PCP approximately 1 week ago and was told it was due to old age  Patient lives at home alone  In speaking with patient, he states that he remembers driving last night and coming into the hospital  He was not able to elaborate further on what happened yesterday  He states he is doing well and denies any new complaints at this time  Inpatient consult to Neurology  Consult performed by: COSME Goins  Consult ordered by: COSME Phillips        Review of Systems   HENT: Negative for trouble swallowing  Eyes: Negative for photophobia and visual disturbance  Respiratory: Negative for shortness of breath  Cardiovascular: Negative for chest pain  Musculoskeletal: Negative for arthralgias, back pain, myalgias and neck pain  Neurological: Negative for dizziness, tremors, seizures, syncope, facial asymmetry, speech difficulty, weakness, light-headedness, numbness and headaches  Psychiatric/Behavioral: Negative for agitation and hallucinations  The patient is not nervous/anxious  All other systems reviewed and are negative  Historical Information   Past Medical History:   Diagnosis Date    A-fib ([facility] )     CKD (chronic kidney disease) stage 3, GFR 30-59 ml/min ([facility])     Diabetes mellitus ([place] [address] )      History reviewed  No pertinent surgical history  Social History   Social History     Substance and Sexual Activity   Alcohol Use Never    Frequency: Never     Social History     Substance and Sexual Activity   Drug Use Never     E-Cigarette/Vaping     E-Cigarette/Vaping Substances     Social History     Tobacco Use   Smoking Status Never Smoker   Smokeless Tobacco Never Used     Family History: History reviewed  No pertinent family history  Review of previous medical records was completed  Meds/Allergies   all current active meds have been reviewed, current meds:   Current Facility-Administered Medications   Medication Dose Route Frequency    acetaminophen (TYLENOL) tablet 650 mg  650 mg Oral Q6H PRN    apixaban (ELIQUIS) tablet 2 5 mg  2 5 mg Oral BID    aspirin chewable tablet 81 mg  81 mg Oral Daily    atorvastatin (LIPITOR) tablet 40 mg  40 mg Oral QPM    fish oil capsule 1,000 mg  1,000 mg Oral Daily    insulin lispro (HumaLOG) 100 units/mL subcutaneous injection 1-5 Units  1-5 Units Subcutaneous TID AC    insulin lispro (HumaLOG) 100 units/mL subcutaneous injection 1-5 Units  1-5 Units Subcutaneous HS    metoprolol succinate (TOPROL-XL) 24 hr tablet 50 mg  50 mg Oral Daily    multivitamin stress formula tablet 1 tablet  1 tablet Oral Daily    and PTA meds:   Prior to Admission Medications   Prescriptions Last Dose Informant Patient Reported? Taking?    MULTIPLE VITAMIN PO  Self Yes No   Sig: Take by mouth Omega-3 Fatty Acids (FISH OIL OMEGA-3 PO)  Self Yes No   Sig: Take by mouth   apixaban (ELIQUIS) 2 5 mg  Self No No   Sig: Take 1 tablet (2 5 mg total) by mouth 2 (two) times a day   glimepiride (AMARYL) 4 mg tablet  Self Yes No   Sig: Take 4 mg by mouth every morning before breakfast   hydrOXYzine HCL (ATARAX) 25 mg tablet  Self Yes No   Sig: Take 25 mg by mouth every 6 (six) hours as needed for itching   insulin glargine (LANTUS) 100 units/mL subcutaneous injection  Self No No   Sig: Inject 10 Units under the skin daily before breakfast for 30 days   metFORMIN (GLUCOPHAGE) 1000 MG tablet  Self Yes No   Sig: Take 1,000 mg by mouth 2 (two) times a day with meals   metoprolol succinate (TOPROL-XL) 50 mg 24 hr tablet  Self No No   Sig: Take 1 tablet (50 mg total) by mouth daily      Facility-Administered Medications: None       No Known Allergies    Objective   Vitals:Blood pressure 140/78, pulse 76, temperature 98 7 °F (37 1 °C), temperature source Oral, resp  rate 19, weight 53 7 kg (118 lb 6 4 oz), SpO2 98 %  ,Body mass index is 18 54 kg/m²  Intake/Output Summary (Last 24 hours) at 9/5/2020 1343  Last data filed at 9/5/2020 0854  Gross per 24 hour   Intake 240 ml   Output 600 ml   Net -360 ml       Invasive Devices: Invasive Devices     Peripheral Intravenous Line            Peripheral IV 09/04/20 Left Antecubital 1 day                Physical Exam  Vitals signs and nursing note reviewed  Constitutional:       General: He is not in acute distress  Appearance: Normal appearance  He is normal weight  HENT:      Head: Normocephalic and atraumatic  Mouth/Throat:      Mouth: Mucous membranes are moist       Pharynx: Oropharynx is clear  Eyes:      General: No scleral icterus  Left eye: No discharge  Extraocular Movements: Extraocular movements intact and EOM normal       Conjunctiva/sclera: Conjunctivae normal       Pupils: Pupils are equal, round, and reactive to light     Neck: Musculoskeletal: Normal range of motion  Cardiovascular:      Rate and Rhythm: Normal rate  Pulmonary:      Effort: Pulmonary effort is normal  No respiratory distress  Musculoskeletal: Normal range of motion  General: No tenderness  Right lower leg: No edema  Left lower leg: No edema  Skin:     General: Skin is warm and dry  Coloration: Skin is not jaundiced  Findings: No erythema  Neurological:      Mental Status: He is alert  Coordination: Finger-Nose-Finger Test normal    Psychiatric:         Mood and Affect: Mood normal          Speech: Speech normal          Behavior: Behavior normal          Thought Content: Thought content normal          Judgment: Judgment normal        Neurologic Exam     Mental Status   Attention: normal  Concentration: normal    Speech: speech is normal   Level of consciousness: alert  Able to states his name, that we are at [facility], and Daren is President  Unable to identify the month or year (stated that it was April 2000)  Able to follow commands appropriately  Able to name objects and calculate correctly  Able to spell the word "world" forward but unable to spell it backwards  Able to name the similarities of objects correctly  No dysarthria noted  Cranial Nerves     CN II   Visual acuity: normal  Right visual field deficit: none  Left visual field deficit: none     CN III, IV, VI   Pupils are equal, round, and reactive to light  Extraocular motions are normal    Nystagmus: none   Conjugate gaze: present    CN V   Facial sensation intact  CN VII   Facial expression full, symmetric       CN VIII   CN VIII normal      CN XI   CN XI normal      CN XII   CN XII normal      Motor Exam   Muscle bulk: normal  Right arm pronator drift: absent  Left arm pronator drift: absent  Bilateral UE strength 5/5 deltoids, biceps, triceps, hand   Bilateral LE strength 5/5 hip flexion, dorsiflexion, plantar flexion     Sensory Exam Light touch normal      Gait, Coordination, and Reflexes     Coordination   Finger to nose coordination: normal    Tremor   Resting tremor: absent  Intention tremor: absent    Reflexes   Right plantar: equivocal  Left plantar: equivocal      Lab Results:   I have personally reviewed pertinent reports  , CBC:   Results from last 7 days   Lab Units 09/05/20 0438 09/04/20 2028 08/31/20 2124   WBC Thousand/uL 6 34 7 14 6 57   RBC Million/uL 3 63* 4 11 4 32   HEMOGLOBIN g/dL 11 1* 12 5 13 0   HEMATOCRIT % 34 2* 38 4 41 3   MCV fL 94 93 96   PLATELETS Thousands/uL 142* 166 199   , BMP/CMP:   Results from last 7 days   Lab Units 09/05/20 0438 09/04/20 2028   SODIUM mmol/L 141 142   POTASSIUM mmol/L 4 6 4 7   CHLORIDE mmol/L 107 107   CO2 mmol/L 30 32   BUN mg/dL 27* 32*   CREATININE mg/dL 1 38* 1 52*   CALCIUM mg/dL 8 4 9 3   AST U/L  --  18   ALT U/L  --  27   ALK PHOS U/L  --  53   EGFR ml/min/1 73sq m 46 41   , Vitamin B12:   Results from last 7 days   Lab Units 09/04/20 2028   VITAMIN B 12 pg/mL 567   , HgBA1C:   , TSH:   Results from last 7 days   Lab Units 09/04/20 2028 08/31/20 2124   TSH 3RD GENERATON uIU/mL 3 086 3 440   , Coagulation:   , Lipid Profile:   Results from last 7 days   Lab Units 09/05/20  0438   HDL mg/dL 29*   LDL CALC mg/dL 100   TRIGLYCERIDES mg/dL 102   , Ammonia:   , Urinalysis:   Results from last 7 days   Lab Units 09/05/20  0124   COLOR UA  Yellow   CLARITY UA  Clear   SPEC GRAV UA  1 015   PH UA  7 0   LEUKOCYTES UA  Negative   NITRITE UA  Negative   GLUCOSE UA mg/dl Negative   KETONES UA mg/dl Negative   BILIRUBIN UA  Negative   BLOOD UA  Negative   , Drug Screen:   ,       Imaging Studies: I have personally reviewed pertinent reports  EKG, Pathology, and Other Studies: I have personally reviewed pertinent reports      VTE Prophylaxis: Sequential compression device (Venodyne) and Eliquis

## 2020-09-05 NOTE — PLAN OF CARE
Problem: PHYSICAL THERAPY ADULT  Goal: Performs mobility at highest level of function for planned discharge setting  See evaluation for individualized goals  Description: Treatment/Interventions: Functional transfer training, LE strengthening/ROM, Elevations, Therapeutic exercise, Endurance training, Cognitive reorientation, Patient/family training, Equipment eval/education, Bed mobility, Gait training  Equipment Recommended: Other (Comment)(roller walker)       See flowsheet documentation for full assessment, interventions and recommendations  Outcome: Progressing  Note: Prognosis: Fair  Problem List: Decreased strength, Decreased endurance, Impaired balance, Decreased mobility, Decreased safety awareness, Decreased cognition  Assessment: Pt presents with altered mental status  Pt was found driving erratically and was pulled over by police  Dx: altered mental status, a-fib, protein calorie malnutrition, RUTHY, and DM  order placed for PT eval and tx, w/ activity order of up w/ A and seizure precautions  pt presents w/ comorbidities of a-fib, CKD, DM, LE edema, and hyperlipidemia and personal factors of advanced age, decreased cognition and limited home support  pt presents w/ weakness, decreased endurance, impaired cognition, impaired balance, gait deviations, decreased safety awareness and fall risk  these impairments are evident in findings from physical examination (weakness), mobility assessment (need for min assist w/ all phases of mobility when usually mobilizing independently, tolerance to only 100 feet of ambulation and need for cueing for mobility technique), and Barthel Index: 65/100, Gait Speed: 0 56 m/s (less than 1 0 m/s indicates need for intervention to address falls risk) and 4 Item Dynamic Gait Index: 6/12 (less than 10 indicates fall risk risk for falls)  pt needed input for task focus and mobility technique/safety  pt is at risk for falls due to physical and safety awareness deficits   pt's clinical presentation is unstable/unpredictable (evident in poor blood pressure control, need for assist w/ all phases of mobility when usually mobilizing independently, tolerance to only 100 feet of ambulation and need for input for task focus and mobility technique/safety w/ limited retention of input received)  pt needs inpatient PT tx to improve mobility deficits  discharge recommendation is for inpatient rehab to reduce fall risk and maximize level of functional independence  Pt was noted to have improved gait speed and decreased level of assistance w/ use of roller walker  PT Discharge Recommendation: Post-Acute Rehabilitation Services          See flowsheet documentation for full assessment

## 2020-09-05 NOTE — PHYSICAL THERAPY NOTE
PHYSICAL THERAPY EVALUATION NOTE    Patient Name: Prince Hutton  [id number] Date: 9/5/2020  AGE:   80 y o  Mrn:   [id number]  ADMIT DX:  Altered mental status [R41 82]  Altered mental state [R41 82]  Hypoglycemia [E16 2]    Past Medical History:   Diagnosis Date    [facility]fib ([facility] )     CKD (chronic kidney disease) stage 3, GFR 30-59 ml/min ([facility]     Diabetes mellitus ([facility] )      Length Of Stay: 1  PHYSICAL THERAPY EVALUATION :    09/05/20 0854   Pain Assessment   Pain Assessment Tool Pain Assessment not indicated - pt denies pain   Home Living   Type of [address] [place] [address] One level; Other (Comment)  (no COY)   Additional Comments lives alone  ambulates w/o assistive device  independent w/ ADLs and driving  no falls in last 6 months  daughter calls twice a day  Prior Function   Comments pt seen sitting in chair  agreed to PT eval  denied pain or dizziness  pt wants to go home and take a shower  redirection was needed for task focus  Restrictions/Precautions   Other Precautions Chair Alarm; Bed Alarm;Cognitive;Telemetry; Fall Risk   General   Additional Pertinent History 9/4/20 at 19:32, blood pressure was 158/105  Family/Caregiver Present No   Cognition   Arousal/Participation Cooperative   Orientation Level Oriented to person;Oriented to place;Oriented to time;Disoriented to situation; Other (Comment)  (pt was identified w/ full name, birth date)   Following Commands Follows one step commands with increased time or repetition   Comments room air resting pulse ox 95% and 74 BPM    RUE Assessment   RUE Assessment WFL   LUE Assessment   LUE Assessment WFL   RLE Assessment   RLE Assessment WFL  (4-/5)   LLE Assessment   LLE Assessment WFL  (4-/5)   Coordination   Movements are Fluid and Coordinated 1   Light Touch   RLE Light Touch Grossly intact   LLE Light Touch Grossly intact   Bed Mobility   Additional Comments 4 Item DGI: 6/12  Comfortable Gait Speed w/o assistive device: 0 50 m/s  Comfortable Gait Speed w/ roller walker: 0 56 m/s   Transfers   Sit to Stand 4  Minimal assistance   Additional items Assist x 1; Increased time required;Verbal cues  (for LE positioning, hand placement (w/ roller walker))   Stand to Sit 4  Minimal assistance   Additional items Assist x 1;Verbal cues  (for body positioning, controlled descent)   Ambulation/Elevation   Gait pattern Decreased foot clearance; Inconsistent joni; Foward flexed; Shuffling   Gait Assistance 4  Minimal assist  (w/o assistive device  supervision w/ roller walker )   Additional items Assist x 1;Verbal cues; Tactile cues  (for full step length, posture, obstacles/safety)   Assistive Device None;Rolling walker   Distance 60, 80 feet w/o assistive device w/ standing rest break x 1 minute  seated rest break x 2 minutes  100 feet w/ roller walker   (additional not possible due to fatigue )   Balance   Static Sitting Good   Static Standing Fair   Ambulatory Fair -  (w/ roller walker)   Activity Tolerance   Activity Tolerance Patient limited by fatigue   Nurse Made Aware spoke to Ary Collaoz OT   Assessment   Prognosis Fair   Problem List Decreased strength;Decreased endurance; Impaired balance;Decreased mobility; Decreased safety awareness;Decreased cognition   Assessment Pt presents with altered mental status  Pt was found driving erratically and was pulled over by police  Dx: altered mental status, a-fib, protein calorie malnutrition, RUTHY, and DM  order placed for PT eval and tx, w/ activity order of up w/ A and seizure precautions  pt presents w/ comorbidities of a-fib, CKD, DM, LE edema, and hyperlipidemia and personal factors of advanced age, decreased cognition and limited home support  pt presents w/ weakness, decreased endurance, impaired cognition, impaired balance, gait deviations, decreased safety awareness and fall risk   these impairments are evident in findings from physical examination (weakness), mobility assessment (need for min assist w/ all phases of mobility when usually mobilizing independently, tolerance to only 100 feet of ambulation and need for cueing for mobility technique), and Barthel Index: 65/100, Gait Speed: 0 56 m/s (less than 1 0 m/s indicates need for intervention to address falls risk) and 4 Item Dynamic Gait Index: 6/12 (less than 10 indicates fall risk risk for falls)  pt needed input for task focus and mobility technique/safety  pt is at risk for falls due to physical and safety awareness deficits  pt's clinical presentation is unstable/unpredictable (evident in poor blood pressure control, need for assist w/ all phases of mobility when usually mobilizing independently, tolerance to only 100 feet of ambulation and need for input for task focus and mobility technique/safety w/ limited retention of input received)  pt needs inpatient PT tx to improve mobility deficits  discharge recommendation is for inpatient rehab to reduce fall risk and maximize level of functional independence  Pt was noted to have improved gait speed and decreased level of assistance w/ use of roller walker  Goals   Patient Goals go home and take a shower   STG Expiration Date 09/15/20   Short Term Goal #1 pt will:  Increase bilateral LE strength 1/2 grade to facilitate independent mobility, Perform all bed mobility tasks modified independent to decrease fall risk factors, Perform all transfers modified independent to improve independence, Ambulate 200 ft  with least restrictive assistive device w/ supervision w/o LOB to expedite return home to allow showering (per pt's goals), Navigate 3 stairs w/ supervision with unilateral handrail to facilitate return to previous living environment, Increase ambulatory balance 1/2 grade to decrease risk for falls, Complete exercise program independently, Tolerate 3 hr OOB to faciliate upright tolerance, Improve gait speed to 0 66 m/s to reduce fall risk and increase independence, Improve Barthel Index score to 90 or greater to facilitate independence and Increase 4 Item DGI score to 10/12 or greater to decrease risk for falls   PT Treatment Day 0   Plan   Treatment/Interventions Functional transfer training;LE strengthening/ROM; Elevations; Therapeutic exercise; Endurance training;Cognitive reorientation;Patient/family training;Equipment eval/education; Bed mobility;Gait training   PT Frequency 5x/wk   Recommendation   PT Discharge Recommendation Post-Acute Rehabilitation Services   Equipment Recommended Other (Comment)  (roller walker)   Barthel Index   Feeding 10   Bathing 0   Grooming Score 5   Dressing Score 5   Bladder Score 10   Bowels Score 10   Toilet Use Score 5   Transfers (Bed/Chair) Score 10   Mobility (Level Surface) Score 10   Stairs Score 0   Barthel Index Score 65     4 Item Dynamic Gait Index  2/3 Gait level surface  1/3 Change in gait speed  1/3 Gait with horizontal head turns  2/3 Gait with vertical head turns  6/12 total score (<10/12 indicates increased risk of fall)    Comfortable Gait Speed w/o assistive device: 0 50 m/s  Comfortable Gait Speed w/ roller walker: 0 56 m/s  Gait Speed Interpretation:  Gain of 0 1 m/s is a predictor of well-being in those w/ abnormal walking speed compared to age-patched peers    Household ambulator: <0 4 m/s  Limited community ambulator: 0 4-0 8 m/s  [employer] ambulator: 0 8-1 2 m/s  Able to safely cross streets: >1 2 m/s    Skilled PT recommended while in hospital and upon DC to progress pt toward treatment goals       Maria Luz Landis, PT

## 2020-09-06 PROBLEM — I63.9 STROKE (CEREBRUM) (HCC): Status: ACTIVE | Noted: 2020-09-06

## 2020-09-06 LAB
GLUCOSE SERPL-MCNC: 126 MG/DL (ref 65–140)
GLUCOSE SERPL-MCNC: 179 MG/DL (ref 65–140)
GLUCOSE SERPL-MCNC: 181 MG/DL (ref 65–140)
GLUCOSE SERPL-MCNC: 339 MG/DL (ref 65–140)

## 2020-09-06 PROCEDURE — 99232 SBSQ HOSP IP/OBS MODERATE 35: CPT | Performed by: FAMILY MEDICINE

## 2020-09-06 PROCEDURE — 82948 REAGENT STRIP/BLOOD GLUCOSE: CPT

## 2020-09-06 RX ADMIN — B-COMPLEX W/ C & FOLIC ACID TAB 1 TABLET: TAB at 09:29

## 2020-09-06 RX ADMIN — INSULIN LISPRO 4 UNITS: 100 INJECTION, SOLUTION INTRAVENOUS; SUBCUTANEOUS at 13:40

## 2020-09-06 RX ADMIN — Medication 1000 MG: at 09:29

## 2020-09-06 RX ADMIN — INSULIN LISPRO 1 UNITS: 100 INJECTION, SOLUTION INTRAVENOUS; SUBCUTANEOUS at 20:51

## 2020-09-06 RX ADMIN — ASPIRIN 81 MG 81 MG: 81 TABLET ORAL at 09:29

## 2020-09-06 RX ADMIN — APIXABAN 2.5 MG: 2.5 TABLET, FILM COATED ORAL at 18:18

## 2020-09-06 RX ADMIN — METOPROLOL SUCCINATE 50 MG: 50 TABLET, EXTENDED RELEASE ORAL at 09:29

## 2020-09-06 RX ADMIN — ATORVASTATIN CALCIUM 40 MG: 40 TABLET, FILM COATED ORAL at 18:18

## 2020-09-06 RX ADMIN — INSULIN LISPRO 1 UNITS: 100 INJECTION, SOLUTION INTRAVENOUS; SUBCUTANEOUS at 18:18

## 2020-09-06 RX ADMIN — APIXABAN 2.5 MG: 2.5 TABLET, FILM COATED ORAL at 09:29

## 2020-09-06 NOTE — SOCIAL WORK
CM met with Pt and daughter Wendy Owens to discuss the A post acute care recommendation was made by your care team for STR  Discussed Freedom of Choice with both patient and caregiver  List of facilities given to both patient and caregiver via in person  both patient and caregiver aware the list is custom filtered for them by preference  and that [facility] post acute providers are designated  Wendy Owens requested a list of facilities that would be closer to [place] [id number]  CM provided the requested SNF list, and was requested to make referrals to [facility] and [address] [place] [address]  EVA made the requested referrals

## 2020-09-06 NOTE — PLAN OF CARE
Problem: Nutrition/Hydration-ADULT  Goal: Nutrient/Hydration intake appropriate for improving, restoring or maintaining nutritional needs  Description: Monitor and assess patient's nutrition/hydration status for malnutrition  Collaborate with interdisciplinary team and initiate plan and interventions as ordered  Monitor patient's weight and dietary intake as ordered or per policy  Utilize nutrition screening tool and intervene as necessary  Determine patient's food preferences and provide high-protein, high-caloric foods as appropriate  INTERVENTIONS:  - Monitor oral intake, urinary output, labs, and treatment plans  - Assess nutrition and hydration status and recommend course of action  - Evaluate amount of meals eaten  - Assist patient with eating if necessary   - Allow adequate time for meals  - Recommend/ encourage appropriate diets, oral nutritional supplements, and vitamin/mineral supplements  - Order, calculate, and assess calorie counts as needed  - Recommend, monitor, and adjust tube feedings and TPN/PPN based on assessed needs  - Assess need for intravenous fluids  - Provide specific nutrition/hydration education as appropriate  - Include patient/family/caregiver in decisions related to nutrition  Outcome: Progressing     Problem: Neurological Deficit  Goal: Neurological status is stable or improving  Description: Interventions:  - Monitor and assess patient's level of consciousness, motor function, sensory function, and level of assistance needed for ADLs  - Monitor and report changes from baseline  Collaborate with interdisciplinary team to initiate plan and implement interventions as ordered  - Provide and maintain a safe environment  - Consider seizure precautions  - Consider fall precautions  - Consider aspiration precautions  - Consider bleeding precautions  Outcome: Progressing     Problem:  Activity Intolerance/Impaired Mobility  Goal: Mobility/activity is maintained at optimum level for patient  Description: Interventions:  - Assess and monitor patient  barriers to mobility and need for assistive/adaptive devices  - Assess patient's emotional response to limitations  - Collaborate with interdisciplinary team and initiate plans and interventions as ordered  - Encourage independent activity per ability   - Maintain proper body alignment  - Perform active/passive rom as tolerated/ordered  - Plan activities to conserve energy   - Turn patient as appropriate  Outcome: Progressing     Problem: Communication Impairment  Goal: Ability to express needs and understand communication  Description: Assess patient's communication skills and ability to understand information  Patient will demonstrate use of effective communication techniques, alternative methods of communication and understanding even if not able to speak  - Encourage communication and provide alternate methods of communication as needed  - Collaborate with case management/ for discharge needs  - Include patient/family/caregiver in decisions related to communication  Outcome: Progressing     Problem: Potential for Aspiration  Goal: Non-ventilated patient's risk of aspiration is minimized  Description: Assess and monitor vital signs, respiratory status, and labs (WBC)  Monitor for signs of aspiration (tachypnea, cough, rales, wheezing, cyanosis, fever)  - Assess and monitor patient's ability to swallow  - Place patient up in chair to eat if possible  - HOB up at 90 degrees to eat if unable to get patient up into chair   - Supervise patient during oral intake  - Instruct patient/ family to take small bites  - Instruct patient/ family to take small single sips when taking liquids    - Follow patient-specific strategies generated by speech pathologist   Outcome: Progressing  Goal: Ventilated patient's risk of aspiration is minimized  Description: Assess and monitor vital signs, respiratory status, airway cuff pressure, and labs (WBC)  Monitor for signs of aspiration (tachypnea, cough, rales, wheezing, cyanosis, fever)  - Elevate head of bed 30 degrees if patient has tube feeding   - Monitor tube feeding  Outcome: Progressing     Problem: Nutrition  Goal: Nutrition/Hydration status is improving  Description: Monitor and assess patient's nutrition/hydration status for malnutrition (ex- brittle hair, bruises, dry skin, pale skin and conjunctiva, muscle wasting, smooth red tongue, and disorientation)  Collaborate with interdisciplinary team and initiate plan and interventions as ordered  Monitor patient's weight and dietary intake as ordered or per policy  Utilize nutrition screening tool and intervene per policy  Determine patient's food preferences and provide high-protein, high-caloric foods as appropriate  - Assist patient with eating   - Allow adequate time for meals   - Encourage patient to take dietary supplement as ordered  - Collaborate with clinical nutritionist   - Include patient/family/caregiver in decisions related to nutrition  Outcome: Progressing     Problem: SAFETY ADULT  Goal: Patient will remain free of falls  Description: INTERVENTIONS:  - Assess patient frequently for physical needs  -  Identify cognitive and physical deficits and behaviors that affect risk of falls    -  [place] fall precautions as indicated by assessment   - Educate patient/family on patient safety including physical limitations  - Instruct patient to call for assistance with activity based on assessment  - Modify environment to reduce risk of injury  - Consider OT/PT consult to assist with strengthening/mobility  Outcome: Progressing  Goal: Maintain or return to baseline ADL function  Description: INTERVENTIONS:  -  Assess patient's ability to carry out ADLs; assess patient's baseline for ADL function and identify physical deficits which impact ability to perform ADLs (bathing, care of mouth/teeth, toileting, grooming, dressing, etc )  - Assess/evaluate cause of self-care deficits   - Assess range of motion  - Assess patient's mobility; develop plan if impaired  - Assess patient's need for assistive devices and provide as appropriate  - Encourage maximum independence but intervene and supervise when necessary  - Involve family in performance of ADLs  - Assess for home care needs following discharge   - Consider OT consult to assist with ADL evaluation and planning for discharge  - Provide patient education as appropriate  Outcome: Progressing  Goal: Maintain or return mobility status to optimal level  Description: INTERVENTIONS:  - Assess patient's baseline mobility status (ambulation, transfers, stairs, etc )    - Identify cognitive and physical deficits and behaviors that affect mobility  - Identify mobility aids required to assist with transfers and/or ambulation (gait belt, sit-to-stand, lift, walker, cane, etc )  - [place] fall precautions as indicated by assessment  - Record patient progress and toleration of activity level on Mobility SBAR; progress patient to next Phase/Stage  - Instruct patient to call for assistance with activity based on assessment  - Consider rehabilitation consult to assist with strengthening/weightbearing, etc   Outcome: Progressing     Problem: DISCHARGE PLANNING  Goal: Discharge to home or other facility with appropriate resources  Description: INTERVENTIONS:  - Identify barriers to discharge w/patient and caregiver  - Arrange for needed discharge resources and transportation as appropriate  - Identify discharge learning needs (meds, wound care, etc )  - Arrange for interpretive services to assist at discharge as needed  - Refer to Case Management Department for coordinating discharge planning if the patient needs post-hospital services based on physician/advanced practitioner order or complex needs related to functional status, cognitive ability, or social support system  Outcome: Progressing Problem: Knowledge Deficit  Goal: Patient/family/caregiver demonstrates understanding of disease process, treatment plan, medications, and discharge instructions  Description: Complete learning assessment and assess knowledge base    Interventions:  - Provide teaching at level of understanding  - Provide teaching via preferred learning methods  Outcome: Progressing

## 2020-09-06 NOTE — PROGRESS NOTES
Progress Note Nba Hutton 1934, 80 y o  male MRN: [id number]    Unit/Bed#: S -01 Encounter: [id number]    Primary Care Provider: Chikis Kimbrough MD   Date and time admitted to hospital: 9/4/2020  7:29 PM        * Altered mental state  Assessment & Plan  · Presentation: pulled over by police due to erratic driving  Patient is unable to explain situation  Has slowed speech  Per patient's family, rapid onset of acute confusion for the past 2 weeks  Reports symptoms wax and wane  · Hypoglycemic with EMS, blood sugar 50s( setting of gliburide  · Brain MRI:  Acute occipital stroke  · Inpatient rehab by PT:   · Will keep Eliquis as patient was not taking at home and coumadin will be harder to monitor    Stroke (cerebrum) [facility])  Assessment & Plan  MRI:Small early infarcts identified within the left occipital lobe without mass effect or hemorrhagic transformation  Continue Eliquis  Pending rehab option     Paroxysmal atrial fibrillation (HCC)  Assessment & Plan  · Rate controlled on metoprolol   · Anticoagulated on eliquis  Family reports no missed doses of eliquis, but patient manages his own medications independently  · Echo 2019:  LVEF 65%,  Mild mitral   Mild aortic regurg  Protein-calorie malnutrition ([address] )  Assessment & Plan  Malnutrition Findings:           BMI Findings: Body mass index is 19 37 kg/m²  · Malnutrition type: chronic illness, diabetes  · Mild muscle fat loss in clavicles, triceps, temporals  · Family reports 2 lb weight loss in one week as reported by PCP  · Liberalize diet     Acute kidney injury ([facility] [address] )  Assessment & Plan   Creatinine upon admission: 1 52  o Baseline: 1 2   Secondary to decreased PO intake    Treatment: IV fluids   Monitor BMP      Type 2 diabetes mellitus with hyperglycemia, with long-term current use of insulin [facility])  Assessment & Plan  Lab Results   Component Value Date    HGBA1C 6 7 (H) 07/06/2020       Recent Labs     09/04/20 1936 20  2259 20  0102   POCGLU 83 93 177*       Blood Sugar Average: Last 72 hrs:  (P) 747 [id number]     · A1c 6 7  Hypoglycemic with EMS with blood sugar in 50s  Received D50 in ED  · Home regimen: metformin, glimepiride, lantus 10 units daily ---hold all  · Per chart review, patient has been on metformin, glimeperide, lantus 10 units since 2019  · Frequent accucheck overnight   · At discharge discontineu Glimepiride( no safe and HA1C low for age )      VTE Pharmacologic Prophylaxis:   Pharmacologic: Apixaban (Eliquis)  Mechanical VTE Prophylaxis in Place: Yes    Patient Centered Rounds: I have performed bedside rounds with nursing staff today  Discussions with Specialists or Other Care Team Provider:     Education and Discussions with Family / Patient: patient    Time Spent for Care: 20 minutes  More than 50% of total time spent on counseling and coordination of care as described above  Current Length of Stay: 2 day(s)    Current Patient Status: Inpatient   Certification Statement: The patient will continue to require additional inpatient hospital stay due to acute above conditions    Discharge Plan: depend on placement     Code Status: Level 1 - Full Code      Subjective:   I am better, I agree to take the Eliquis BID as recommend, I didn't know I had to take it twice  Objective:     Vitals:   Temp (24hrs), Av 5 °F (36 9 °C), Min:98 2 °F (36 8 °C), Max:98 7 °F (37 1 °C)    Temp:  [98 2 °F (36 8 °C)-98 7 °F (37 1 °C)] 98 2 °F (36 8 °C)  HR:  [60-76] 60  Resp:  [18-19] 18  BP: (123-155)/(59-78) 136/66  SpO2:  [93 %-99 %] 94 %  Body mass index is 17 92 kg/m²  Input and Output Summary (last 24 hours): Intake/Output Summary (Last 24 hours) at 2020 0933  Last data filed at 2020 0516  Gross per 24 hour   Intake 240 ml   Output 800 ml   Net -560 ml       Physical Exam:     Physical Exam  Cardiovascular:      Rate and Rhythm: Normal rate and regular rhythm  Pulses: Normal pulses  Heart sounds: Normal heart sounds  Pulmonary:      Effort: Pulmonary effort is normal       Breath sounds: Normal breath sounds  Abdominal:      General: Abdomen is flat  Bowel sounds are normal  There is no distension  Tenderness: There is no abdominal tenderness  Skin:     General: Skin is warm  Neurological:      General: No focal deficit present  Mental Status: He is alert and oriented to person, place, and time  Mental status is at baseline  Psychiatric:         Mood and Affect: Mood normal          Additional Data:     Labs:    Results from last 7 days   Lab Units 09/05/20  0438   WBC Thousand/uL 6 34   HEMOGLOBIN g/dL 11 1*   HEMATOCRIT % 34 2*   PLATELETS Thousands/uL 142*   NEUTROS PCT % 48   LYMPHS PCT % 40   MONOS PCT % 7   EOS PCT % 5     Results from last 7 days   Lab Units 09/05/20  0438 09/04/20  2028   POTASSIUM mmol/L 4 6 4 7   CHLORIDE mmol/L 107 107   CO2 mmol/L 30 32   BUN mg/dL 27* 32*   CREATININE mg/dL 1 38* 1 52*   CALCIUM mg/dL 8 4 9 3   ALK PHOS U/L  --  53   ALT U/L  --  27   AST U/L  --  18           * I Have Reviewed All Lab Data Listed Above  * Additional Pertinent Lab Tests Reviewed:  All Labs Within Last 24 Hours Reviewed    Imaging:    Imaging Reports Reviewed Today Include:  Imaging Personally Reviewed by Myself Includes:      Recent Cultures (last 7 days):           Last 24 Hours Medication List:   Current Facility-Administered Medications   Medication Dose Route Frequency Provider Last Rate    acetaminophen  650 mg Oral Q6H PRN Emilguillermo Eugene, CRNP      apixaban  2 5 mg Oral BID [facility], CRNP      aspirin  81 mg Oral Daily [place], [place]      atorvastatin  40 mg Oral QPM [facility] Frimaykel, CRNP      fish oil  1,000 mg Oral Daily [facility] Jabier, CRNP      insulin lispro  1-5 Units Subcutaneous TID AC Elvis Snider MD      insulin lispro  1-5 Units Subcutaneous HS Elvis Snider MD      metoprolol succinate  50 mg Oral Daily Modest[facility] COSME Gilbert      multivitamin stress formula  1 tablet Oral Daily COSME Salazar          Today, Patient Was Seen By: Ashely Jackson MD    ** Please Note: Dragon 360 Dictation voice to text software may have been used in the creation of this document   **

## 2020-09-06 NOTE — ASSESSMENT & PLAN NOTE
MRI:Small early infarcts identified within the left occipital lobe without mass effect or hemorrhagic transformation    Continue Eliquis  Pending rehab option

## 2020-09-07 ENCOUNTER — APPOINTMENT (INPATIENT)
Dept: NON INVASIVE DIAGNOSTICS | Facility: HOSPITAL | Age: 85
DRG: 682 | End: 2020-09-07
Payer: COMMERCIAL

## 2020-09-07 PROBLEM — E44.1 MILD PROTEIN-CALORIE MALNUTRITION (HCC): Status: ACTIVE | Noted: 2020-09-07

## 2020-09-07 PROBLEM — N18.9 ACUTE KIDNEY INJURY SUPERIMPOSED ON CHRONIC KIDNEY DISEASE (HCC): Status: ACTIVE | Noted: 2019-04-06

## 2020-09-07 PROBLEM — E11.9 TYPE 2 DIABETES MELLITUS (HCC): Status: ACTIVE | Noted: 2019-04-06

## 2020-09-07 LAB
GLUCOSE SERPL-MCNC: 132 MG/DL (ref 65–140)
GLUCOSE SERPL-MCNC: 196 MG/DL (ref 65–140)
GLUCOSE SERPL-MCNC: 285 MG/DL (ref 65–140)
GLUCOSE SERPL-MCNC: 303 MG/DL (ref 65–140)

## 2020-09-07 PROCEDURE — 99232 SBSQ HOSP IP/OBS MODERATE 35: CPT | Performed by: PHYSICIAN ASSISTANT

## 2020-09-07 PROCEDURE — 93306 TTE W/DOPPLER COMPLETE: CPT

## 2020-09-07 PROCEDURE — 93306 TTE W/DOPPLER COMPLETE: CPT | Performed by: INTERNAL MEDICINE

## 2020-09-07 PROCEDURE — 82948 REAGENT STRIP/BLOOD GLUCOSE: CPT

## 2020-09-07 RX ADMIN — B-COMPLEX W/ C & FOLIC ACID TAB 1 TABLET: TAB at 08:23

## 2020-09-07 RX ADMIN — INSULIN LISPRO 1 UNITS: 100 INJECTION, SOLUTION INTRAVENOUS; SUBCUTANEOUS at 16:50

## 2020-09-07 RX ADMIN — METOPROLOL SUCCINATE 50 MG: 50 TABLET, EXTENDED RELEASE ORAL at 08:23

## 2020-09-07 RX ADMIN — APIXABAN 2.5 MG: 2.5 TABLET, FILM COATED ORAL at 08:23

## 2020-09-07 RX ADMIN — INSULIN LISPRO 3 UNITS: 100 INJECTION, SOLUTION INTRAVENOUS; SUBCUTANEOUS at 11:34

## 2020-09-07 RX ADMIN — APIXABAN 2.5 MG: 2.5 TABLET, FILM COATED ORAL at 16:50

## 2020-09-07 RX ADMIN — ATORVASTATIN CALCIUM 40 MG: 40 TABLET, FILM COATED ORAL at 16:51

## 2020-09-07 RX ADMIN — Medication 1000 MG: at 08:23

## 2020-09-07 RX ADMIN — INSULIN LISPRO 3 UNITS: 100 INJECTION, SOLUTION INTRAVENOUS; SUBCUTANEOUS at 21:49

## 2020-09-07 RX ADMIN — ASPIRIN 81 MG 81 MG: 81 TABLET ORAL at 08:23

## 2020-09-07 NOTE — ASSESSMENT & PLAN NOTE
 Creatinine upon admission: 1 52  o Baseline: 1 2; noted to be 1 38 as of 2 days ago   Secondary to decreased PO intake    Treatment: IV fluids   Monitor BMP again in am

## 2020-09-07 NOTE — PLAN OF CARE
Problem: Nutrition/Hydration-ADULT  Goal: Nutrient/Hydration intake appropriate for improving, restoring or maintaining nutritional needs  Description: Monitor and assess patient's nutrition/hydration status for malnutrition  Collaborate with interdisciplinary team and initiate plan and interventions as ordered  Monitor patient's weight and dietary intake as ordered or per policy  Utilize nutrition screening tool and intervene as necessary  Determine patient's food preferences and provide high-protein, high-caloric foods as appropriate  INTERVENTIONS:  - Monitor oral intake, urinary output, labs, and treatment plans  - Assess nutrition and hydration status and recommend course of action  - Evaluate amount of meals eaten  - Assist patient with eating if necessary   - Allow adequate time for meals  - Recommend/ encourage appropriate diets, oral nutritional supplements, and vitamin/mineral supplements  - Order, calculate, and assess calorie counts as needed  - Recommend, monitor, and adjust tube feedings and TPN/PPN based on assessed needs  - Assess need for intravenous fluids  - Provide specific nutrition/hydration education as appropriate  - Include patient/family/caregiver in decisions related to nutrition  Outcome: Progressing     Problem: Neurological Deficit  Goal: Neurological status is stable or improving  Description: Interventions:  - Monitor and assess patient's level of consciousness, motor function, sensory function, and level of assistance needed for ADLs  - Monitor and report changes from baseline  Collaborate with interdisciplinary team to initiate plan and implement interventions as ordered  - Provide and maintain a safe environment  - Consider seizure precautions  - Consider fall precautions  - Consider aspiration precautions  - Consider bleeding precautions  Outcome: Progressing     Problem:  Activity Intolerance/Impaired Mobility  Goal: Mobility/activity is maintained at optimum level for patient  Description: Interventions:  - Assess and monitor patient  barriers to mobility and need for assistive/adaptive devices  - Assess patient's emotional response to limitations  - Collaborate with interdisciplinary team and initiate plans and interventions as ordered  - Encourage independent activity per ability   - Maintain proper body alignment  - Perform active/passive rom as tolerated/ordered  - Plan activities to conserve energy   - Turn patient as appropriate  Outcome: Progressing     Problem: Communication Impairment  Goal: Ability to express needs and understand communication  Description: Assess patient's communication skills and ability to understand information  Patient will demonstrate use of effective communication techniques, alternative methods of communication and understanding even if not able to speak  - Encourage communication and provide alternate methods of communication as needed  - Collaborate with case management/ for discharge needs  - Include patient/family/caregiver in decisions related to communication  Outcome: Progressing     Problem: Potential for Aspiration  Goal: Non-ventilated patient's risk of aspiration is minimized  Description: Assess and monitor vital signs, respiratory status, and labs (WBC)  Monitor for signs of aspiration (tachypnea, cough, rales, wheezing, cyanosis, fever)  - Assess and monitor patient's ability to swallow  - Place patient up in chair to eat if possible  - HOB up at 90 degrees to eat if unable to get patient up into chair   - Supervise patient during oral intake  - Instruct patient/ family to take small bites  - Instruct patient/ family to take small single sips when taking liquids    - Follow patient-specific strategies generated by speech pathologist   Outcome: Progressing  Goal: Ventilated patient's risk of aspiration is minimized  Description: Assess and monitor vital signs, respiratory status, airway cuff pressure, and labs (WBC)  Monitor for signs of aspiration (tachypnea, cough, rales, wheezing, cyanosis, fever)  - Elevate head of bed 30 degrees if patient has tube feeding   - Monitor tube feeding  Outcome: Progressing     Problem: Nutrition  Goal: Nutrition/Hydration status is improving  Description: Monitor and assess patient's nutrition/hydration status for malnutrition (ex- brittle hair, bruises, dry skin, pale skin and conjunctiva, muscle wasting, smooth red tongue, and disorientation)  Collaborate with interdisciplinary team and initiate plan and interventions as ordered  Monitor patient's weight and dietary intake as ordered or per policy  Utilize nutrition screening tool and intervene per policy  Determine patient's food preferences and provide high-protein, high-caloric foods as appropriate  - Assist patient with eating   - Allow adequate time for meals   - Encourage patient to take dietary supplement as ordered  - Collaborate with clinical nutritionist   - Include patient/family/caregiver in decisions related to nutrition  Outcome: Progressing     Problem: SAFETY ADULT  Goal: Patient will remain free of falls  Description: INTERVENTIONS:  - Assess patient frequently for physical needs  -  Identify cognitive and physical deficits and behaviors that affect risk of falls    -  [place] fall precautions as indicated by assessment   - Educate patient/family on patient safety including physical limitations  - Instruct patient to call for assistance with activity based on assessment  - Modify environment to reduce risk of injury  - Consider OT/PT consult to assist with strengthening/mobility  Outcome: Progressing  Goal: Maintain or return to baseline ADL function  Description: INTERVENTIONS:  -  Assess patient's ability to carry out ADLs; assess patient's baseline for ADL function and identify physical deficits which impact ability to perform ADLs (bathing, care of mouth/teeth, toileting, grooming, dressing, etc )  - Assess/evaluate cause of self-care deficits   - Assess range of motion  - Assess patient's mobility; develop plan if impaired  - Assess patient's need for assistive devices and provide as appropriate  - Encourage maximum independence but intervene and supervise when necessary  - Involve family in performance of ADLs  - Assess for home care needs following discharge   - Consider OT consult to assist with ADL evaluation and planning for discharge  - Provide patient education as appropriate  Outcome: Progressing  Goal: Maintain or return mobility status to optimal level  Description: INTERVENTIONS:  - Assess patient's baseline mobility status (ambulation, transfers, stairs, etc )    - Identify cognitive and physical deficits and behaviors that affect mobility  - Identify mobility aids required to assist with transfers and/or ambulation (gait belt, sit-to-stand, lift, walker, cane, etc )  - [place] fall precautions as indicated by assessment  - Record patient progress and toleration of activity level on Mobility SBAR; progress patient to next Phase/Stage  - Instruct patient to call for assistance with activity based on assessment  - Consider rehabilitation consult to assist with strengthening/weightbearing, etc   Outcome: Progressing     Problem: DISCHARGE PLANNING  Goal: Discharge to home or other facility with appropriate resources  Description: INTERVENTIONS:  - Identify barriers to discharge w/patient and caregiver  - Arrange for needed discharge resources and transportation as appropriate  - Identify discharge learning needs (meds, wound care, etc )  - Arrange for interpretive services to assist at discharge as needed  - Refer to Case Management Department for coordinating discharge planning if the patient needs post-hospital services based on physician/advanced practitioner order or complex needs related to functional status, cognitive ability, or social support system  Outcome: Progressing Problem: Knowledge Deficit  Goal: Patient/family/caregiver demonstrates understanding of disease process, treatment plan, medications, and discharge instructions  Description: Complete learning assessment and assess knowledge base    Interventions:  - Provide teaching at level of understanding  - Provide teaching via preferred learning methods  Outcome: Progressing

## 2020-09-07 NOTE — ASSESSMENT & PLAN NOTE
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   · Continue statin

## 2020-09-07 NOTE — PROGRESS NOTES
Progress Note Polly Hutton 1934, 80 y o  male MRN: [id number]    Unit/Bed#: S -01 Encounter: [id number]    Primary Care Provider: Nighat Parks MD   Date and time admitted to hospital: 9/4/2020  7:29 PM    * Altered mental state  Assessment & Plan  · Presentation: pulled over by police due to erratic driving  Patient was unable to explain situation  Had slowed speech  Per patient's family, rapid onset of acute confusion noted for the past 2 weeks  Reported that symptoms wax and wane  · Multifactorial explanation with toxic metabolic features in addition to stroke:  · Hypoglycemic with EMS, blood sugar 50s initially--now resolved  · Also RUTHY on admission  · Also found to have acute occipital stroke      Stroke (cerebrum) ([facility] [address] )  Assessment & Plan  · MRI:Small early infarcts identified within the left occipital lobe without mass effect or hemorrhagic transformation  · CTA= posterior cerebral moderate stenosis  · Continue Eliquis twice a day (apparently there was some question about patient only taking it once a day per my colleague's note)  Based on age greater than [de-identified] and weight less than 60 kg, the appropriate dose is 2 5 mg  · 2D echocardiogram ordered and pending  Unclear if this would change plan at all  · Appreciate neurology consult  Unclear if we could stop aspirin? · Continue statin      Paroxysmal atrial fibrillation (HCC)  Assessment & Plan  · Rate controlled on metoprolol   · Anticoagulated on eliquis  Family reported no missed doses of eliquis, but patient manages his own medications independently  · Echo 2019:  LVEF 65%,  Mild mitral   Mild aortic regurg      · Tele stable mild bradycardia, can dc    Type 2 diabetes mellitus [facility])  Assessment & Plan  Lab Results   Component Value Date    HGBA1C 6 8 (H) 09/05/2020       Recent Labs     09/06/20  1217 09/06/20  1549 09/06/20  2048 09/07/20  0728   POCGLU 339* 181* 179* 132       Blood Sugar Average: Last 72 hrs:  (P) 696 [id number]     · Hypoglycemic with EMS with blood sugar in 50s  Received D50 in ED  · Home regimen x >1 year: metformin, glimepiride, lantus 10 units daily ---all on hold  · Aside from some occasional isolated elevated sugars, overall he is very well controlled and given his age I would recommend avoiding hypoglycemia  Likely could discharge on just metformin and diet alone      Acute kidney injury superimposed on chronic kidney disease ([facility] [address] )  Assessment & Plan   Creatinine upon admission: 1 52  o Baseline: 1 2; noted to be 1 38 as of 2 days ago   Secondary to decreased PO intake    Treatment: IV fluids   Monitor BMP again in am    Mild protein-calorie malnutrition (HCC)  Assessment & Plan  Malnutrition Findings:   Malnutrition type: Chronic illness  Degree of Malnutrition: Malnutrition of mild degree(related to inadequate intake/advanced age as evidenced by intake inadequate to maintain a healthy wt (BMI 18 5, pt 80% of IBW), muscle depletion noted (clavicles, temples)  Treatment - supplement )    BMI Findings: Body mass index is 17 85 kg/m²  VTE Pharmacologic Prophylaxis:   Pharmacologic: Apixaban (Eliquis)  Mechanical VTE Prophylaxis in Place: Yes    Patient Centered Rounds: I have performed bedside rounds with nursing staff today  Discussions with Specialists or Other Care Team Provider: neurology    Education and Discussions with Family / Patient: called daughter, she confirmed that he was only taking eliquis once a day rather than twice    Time Spent for Care: 25 min  More than 50% of total time spent on counseling and coordination of care as described above  Current Length of Stay: 3 day(s)    Current Patient Status: Inpatient   Certification Statement: The patient will continue to require additional inpatient hospital stay due to needs rehab placement    Discharge Plan: pending placement ? Tomorrow (today is a holiday)  Revoke 's license?     Code Status: Level 1 - Full Code    Subjective:   Patient doing very well with no complaints or concerns  Offers no complaints of sinus discomfort    Objective:     Vitals:   Temp (24hrs), Av 9 °F (36 6 °C), Min:97 6 °F (36 4 °C), Max:98 1 °F (36 7 °C)    Temp:  [97 6 °F (36 4 °C)-98 1 °F (36 7 °C)] 97 6 °F (36 4 °C)  HR:  [54-64] 54  Resp:  [16-18] 16  BP: (103-137)/(54-71) 137/64  SpO2:  [95 %-100 %] 97 %  Body mass index is 17 85 kg/m²  Input and Output Summary (last 24 hours): Intake/Output Summary (Last 24 hours) at 2020  Last data filed at 2020 0826  Gross per 24 hour   Intake 600 ml   Output 600 ml   Net 0 ml       Physical Exam:     Physical Exam  Vitals signs reviewed  Exam conducted with a chaperone present  Constitutional:       General: He is not in acute distress  Appearance: Normal appearance  He is not ill-appearing, toxic-appearing or diaphoretic  Comments: Thin elderly gentleman initially seen lying in bed but easily sat up without assistance  HENT:      Head: Normocephalic and atraumatic  Nose: No congestion or rhinorrhea  Eyes:      General:         Right eye: No discharge  Left eye: No discharge  Cardiovascular:      Rate and Rhythm: Regular rhythm  Bradycardia present  Heart sounds: No murmur  Pulmonary:      Effort: No respiratory distress  Breath sounds: No stridor  No wheezing or rhonchi  Comments: No dyspnea, tachypnea, cough noted  Abdominal:      Palpations: Abdomen is soft  Tenderness: There is no guarding  Musculoskeletal:         General: No swelling  Right lower leg: No edema  Left lower leg: No edema  Skin:     General: Skin is warm and dry  Coloration: Skin is not jaundiced or pale  Findings: No bruising, erythema, lesion or rash  Neurological:      Mental Status: He is alert  Comments: Awake alert interactive, no tremor noted    Oriented x3   Psychiatric:         Mood and Affect: Mood normal  Thought Content: Thought content normal       Comments: Very pleasant and cooperative       Telemetry reviewed, sinus bradycardia  Additional Data:     Labs:    Results from last 7 days   Lab Units 09/05/20  0438   WBC Thousand/uL 6 34   HEMOGLOBIN g/dL 11 1*   HEMATOCRIT % 34 2*   PLATELETS Thousands/uL 142*   NEUTROS PCT % 48   LYMPHS PCT % 40   MONOS PCT % 7   EOS PCT % 5     Results from last 7 days   Lab Units 09/05/20  0438 09/04/20  2028   SODIUM mmol/L 141 142   POTASSIUM mmol/L 4 6 4 7   CHLORIDE mmol/L 107 107   CO2 mmol/L 30 32   BUN mg/dL 27* 32*   CREATININE mg/dL 1 38* 1 52*   ANION GAP mmol/L 4 3*   CALCIUM mg/dL 8 4 9 3   ALBUMIN g/dL  --  4 1   TOTAL BILIRUBIN mg/dL  --  0 39   ALK PHOS U/L  --  53   ALT U/L  --  27   AST U/L  --  18   GLUCOSE RANDOM mg/dL 187* 86         Results from last 7 days   Lab Units 09/07/20  0728 09/06/20  2048 09/06/20  1549 09/06/20  1217 09/06/20  0625 09/05/20  2045 09/05/20  1554 09/05/20  1121 09/05/20  0649 09/05/20  0320 09/05/20  0102 09/04/20  2259   POC GLUCOSE mg/dl 132 179* 181* 339* 126 168* 123 223* 135 165* 177* 93     Results from last 7 days   Lab Units 09/05/20  0438   HEMOGLOBIN A1C % 6 8*           * I Have Reviewed All Lab Data Listed Above  * Additional Pertinent Lab Tests Reviewed:  Nichelle [facility] Admission Reviewed    Imaging:    Imaging Reports Reviewed Today Include:  CTA, MRI  Imaging Personally Reviewed by Myself Includes:      Recent Cultures (last 7 days):           Last 24 Hours Medication List:   Current Facility-Administered Medications   Medication Dose Route Frequency Provider Last Rate    acetaminophen  650 mg Oral Q6H PRN Denver Bake, CRNP      apixaban  2 5 mg Oral BID Denver Bake, CRNP      aspirin  81 mg Oral Daily Denver Bake, [address]      atorvastatin  40 mg Oral QPM Denver Bake, CRNP      fish oil  1,000 mg Oral Daily Denver Bake, [address]      insulin lispro  1-5 Units Subcutaneous TID  Ravi MartinezSilver, MD      insulin lispro  1-5 Units Subcutaneous HS Ravi Finn MD      metoprolol succinate  50 mg Oral Daily COSME Mack      multivitamin stress formula  1 tablet Oral Daily COSME Mack          Today, Patient Was Seen By: Jojo Barrios PA-C    ** Please Note: Dictation voice to text software may have been used in the creation of this document   **

## 2020-09-07 NOTE — ASSESSMENT & PLAN NOTE
Lab Results   Component Value Date    HGBA1C 6 8 (H) 09/05/2020       Recent Labs     09/06/20  1217 09/06/20  1549 09/06/20 2048 09/07/20  0728   POCGLU 339* 181* 179* 132       Blood Sugar Average: Last 72 hrs:  (P) 212 [id number]     · Hypoglycemic with EMS with blood sugar in 50s  Received D50 in ED  · Home regimen x >1 year: metformin, glimepiride, lantus 10 units daily ---all on hold  · Aside from some occasional isolated elevated sugars, overall he is very well controlled and given his age I would recommend avoiding hypoglycemia    Likely could discharge on just metformin and diet alone

## 2020-09-07 NOTE — ASSESSMENT & PLAN NOTE
· Rate controlled on metoprolol   · Anticoagulated on eliquis  Family reported no missed doses of eliquis, but patient manages his own medications independently  · Echo 2019:  LVEF 65%,  Mild mitral   Mild aortic regurg      · Tele stable mild bradycardia, can dc

## 2020-09-07 NOTE — ASSESSMENT & PLAN NOTE
Malnutrition Findings:   Malnutrition type: Chronic illness  Degree of Malnutrition: Malnutrition of mild degree(related to inadequate intake/advanced age as evidenced by intake inadequate to maintain a healthy wt (BMI 18 5, pt 80% of IBW), muscle depletion noted (clavicles, temples)  Treatment - supplement )    BMI Findings: Body mass index is 17 85 kg/m²

## 2020-09-07 NOTE — ASSESSMENT & PLAN NOTE
· Presentation: pulled over by police due to erratic driving  Patient was unable to explain situation  Had slowed speech  Per patient's family, rapid onset of acute confusion noted for the past 2 weeks  Reported that symptoms wax and wane     · Multifactorial explanation with toxic metabolic features in addition to stroke:  · Hypoglycemic with EMS, blood sugar 50s initially--now resolved  · Also RUTHY on admission  · Also found to have acute occipital stroke

## 2020-09-08 VITALS
TEMPERATURE: 97.6 F | HEART RATE: 60 BPM | DIASTOLIC BLOOD PRESSURE: 51 MMHG | BODY MASS INDEX: 17.78 KG/M2 | SYSTOLIC BLOOD PRESSURE: 98 MMHG | RESPIRATION RATE: 17 BRPM | WEIGHT: 113.54 LBS | OXYGEN SATURATION: 98 %

## 2020-09-08 LAB
ANION GAP SERPL CALCULATED.3IONS-SCNC: 7 MMOL/L (ref 4–13)
BUN SERPL-MCNC: 31 MG/DL (ref 5–25)
CALCIUM SERPL-MCNC: 8.5 MG/DL (ref 8.3–10.1)
CHLORIDE SERPL-SCNC: 105 MMOL/L (ref 100–108)
CO2 SERPL-SCNC: 28 MMOL/L (ref 21–32)
CREAT SERPL-MCNC: 1.23 MG/DL (ref 0.6–1.3)
GFR SERPL CREATININE-BSD FRML MDRD: 53 ML/MIN/1.73SQ M
GLUCOSE SERPL-MCNC: 174 MG/DL (ref 65–140)
GLUCOSE SERPL-MCNC: 178 MG/DL (ref 65–140)
GLUCOSE SERPL-MCNC: 191 MG/DL (ref 65–140)
GLUCOSE SERPL-MCNC: 360 MG/DL (ref 65–140)
POTASSIUM SERPL-SCNC: 4.2 MMOL/L (ref 3.5–5.3)
SODIUM SERPL-SCNC: 140 MMOL/L (ref 136–145)

## 2020-09-08 PROCEDURE — 99239 HOSP IP/OBS DSCHRG MGMT >30: CPT | Performed by: PHYSICIAN ASSISTANT

## 2020-09-08 PROCEDURE — 97535 SELF CARE MNGMENT TRAINING: CPT

## 2020-09-08 PROCEDURE — 97164 PT RE-EVAL EST PLAN CARE: CPT

## 2020-09-08 PROCEDURE — 80048 BASIC METABOLIC PNL TOTAL CA: CPT | Performed by: PHYSICIAN ASSISTANT

## 2020-09-08 PROCEDURE — 82948 REAGENT STRIP/BLOOD GLUCOSE: CPT

## 2020-09-08 PROCEDURE — 97116 GAIT TRAINING THERAPY: CPT

## 2020-09-08 RX ORDER — ATORVASTATIN CALCIUM 40 MG/1
40 TABLET, FILM COATED ORAL EVERY EVENING
Qty: 30 TABLET | Refills: 0 | Status: SHIPPED | OUTPATIENT
Start: 2020-09-08

## 2020-09-08 RX ADMIN — APIXABAN 2.5 MG: 2.5 TABLET, FILM COATED ORAL at 08:09

## 2020-09-08 RX ADMIN — APIXABAN 2.5 MG: 2.5 TABLET, FILM COATED ORAL at 17:23

## 2020-09-08 RX ADMIN — INSULIN LISPRO 1 UNITS: 100 INJECTION, SOLUTION INTRAVENOUS; SUBCUTANEOUS at 08:09

## 2020-09-08 RX ADMIN — METOPROLOL SUCCINATE 50 MG: 50 TABLET, EXTENDED RELEASE ORAL at 08:09

## 2020-09-08 RX ADMIN — INSULIN LISPRO 4 UNITS: 100 INJECTION, SOLUTION INTRAVENOUS; SUBCUTANEOUS at 11:36

## 2020-09-08 RX ADMIN — B-COMPLEX W/ C & FOLIC ACID TAB 1 TABLET: TAB at 08:09

## 2020-09-08 RX ADMIN — Medication 1000 MG: at 08:09

## 2020-09-08 RX ADMIN — ATORVASTATIN CALCIUM 40 MG: 40 TABLET, FILM COATED ORAL at 17:23

## 2020-09-08 NOTE — ASSESSMENT & PLAN NOTE
· MRI:Small early infarcts identified within the left occipital lobe without mass effect or hemorrhagic transformation  · CTA= posterior cerebral moderate stenosis  · Continue Eliquis twice a day (apparently patient was only taking it once a day prior to this event)  Based on age greater than [de-identified] and weight less than 60 kg, the appropriate dose is 2 5 mg  · 2D echocardiogram with EF 98% grade 1 diastolic dysfunction and minimal valvular abnormalities  No vegetation  · Appreciate neurology consult    Seems as if they are planning to continue baby aspirin but defer to them  · Continue statin

## 2020-09-08 NOTE — PLAN OF CARE
Problem: OCCUPATIONAL THERAPY ADULT  Goal: Performs self-care activities at highest level of function for planned discharge setting  See evaluation for individualized goals  Description: Treatment Interventions: ADL retraining, Functional transfer training, UE strengthening/ROM, Endurance training, Neuromuscular reeducation, Continued evaluation, Activityengagement          See flowsheet documentation for full assessment, interventions and recommendations  Outcome: Progressing  Note: Limitation: Decreased ADL status, Decreased UE strength, Decreased cognition, Decreased Safe judgement during ADL, Decreased self-care trans, Decreased high-level ADLs  Prognosis: Fair  Assessment: Pt seen for skilled OT tx session from 9944-2401  Pt agreeable to participate in session focusing on ADL retaining and further cognitive testing  Pt in bathroom upon therapist arrival  Pt required no phys A to complete toileting/personal hygiene and no phys A to ambulate from bathroom to couch  Pt was Mod I for STS transitions w/ + time  Pt donned pants w/ MOD I seated from the couch w/ set-up and + time  Therapist introduced/administered the [employer] Level Screen (ACLS)  Pt reported he would like to try the assessment  Pt scored a 4 4/6 indicating that it is recommended that the person lives w/ someone who does a daily check on the environment to remove any safety hazards and problems solves when minor changes in the home occur  Please see end of progress note for scoring breakdown  Pt demonstrated improved activity tolerance and engagement  At end of session pt was seated on couch w/ call bell in reach and all needs met  Recommendation at time of d/c would be for pt to return to PLOF w/ home OT and increased assistance /daily checks  Recommend OPOT for Fitness to Drive assessment        OT Discharge Recommendation: Home with skilled therapy(+ increased A / daily checks )  OT - OK to Discharge: (when medically stable )

## 2020-09-08 NOTE — PHYSICAL THERAPY NOTE
PHYSICAL THERAPY RE-EVALUATION NOTE    Patient Name: Reece Webb  QHSJF'D Date: 2020     AGE:   80 y o  Mrn:   [id number]  ADMIT DX:  Altered mental status [R41 82]  Altered mental state [R41 82]  Hypoglycemia [E16 2]    Past Medical History:   Diagnosis Date    A-fib ([place] [address] )     CKD (chronic kidney disease) stage 3, GFR 30-59 ml/min ([facility])     Diabetes mellitus ([place] [address] )      Length Of Stay: 4  PHYSICAL THERAPY RE-EVALUATION :   Patient's identity confirmed via 2 patient identifiers (full name and ) at start of session       20 1046   Note Type   Note type Re-eval;Progress   Pain Assessment   Pain Assessment Tool Pain Assessment not indicated - pt denies pain   Pain Score No Pain   Home Living   Additional Comments Pt reports he lives in a "3 SH w/ a couple COY"  This differs from both PT/OT evals on 2020 reporting 1 SH w/ 0 COY  Notified CM who plans to follow up w/ pt and call dtr   Restrictions/Precautions   Other Precautions Cognitive; Fall Risk   General   Family/Caregiver Present No   Cognition   Orientation Level Oriented to person;Oriented to place  (Oriented to month, unable to state day or year)   Memory Decreased short term memory;Decreased recall of recent events   Following Commands Follows one step commands without difficulty   Comments Pt supine in bed upon arrival  He is pleasant and agreeable to participate in therapy intervention  He states he has been ambulating around the room indepedently without AD  Provides inconsistent social hx compared to what was obtained on IE  Pt able to engage in appropriate conversation  Repeats self at times ("i worked at [employer] Able to state it is September, unable to state day or year  Aware he has been here for 4 days      Bed Mobility   Supine to Sit 7  Independent   Sit to Supine 7  Independent   Transfers   Sit to Stand 6  Modified independent   Stand to Sit 6  Modified independent   Additional Comments 4 Item mDGI = 11/12 (<10/12 is at increased risk of falls)   Ambulation/Elevation   Gait pattern Decreased foot clearance   Gait Assistance 6  Modified independent   Assistive Device None   Distance 350 ft   Stair Management Assistance 6  Modified independent   Stair Management Technique One rail R;Alternating pattern   Number of Stairs 16  (full flight)   Curbs GAIT SPEED: comfortable gait speed w/ no AD = 0 955 m/s   Balance   Static Sitting Good   Static Standing Good   Ambulatory Good   Activity Tolerance   Activity Tolerance Patient tolerated treatment well   Medical Staff Made Aware Spoke to OT Caryn, Orbie Severin, EVA Gar   Nurse Made Aware Spoke to RN Jan Lesch who states pt is appropriate to participate in therapy intervention   Assessment   Prognosis Fair   Problem List Decreased strength;Decreased cognition   Assessment Patient seen for physical therapy re-evaluation due to reported improvement in mobility status per RN  They continue to present w/ dx of AMS and stroke  Upon re-evaluation, pt continues to present with the following impairments: weakness, impaired cognition and gait deviations  These impairments are still evident in findings from physical examination (weakness), and Barthel Index: 90/100 and 4 Item mDGI = 11/12 (<10/12 is at increased risk of falls)  Pt needed input for task input at times during session  Presently, pt is able to ambulate around room and hallways independently without difficulty  His overall functional improvements since IE (including exceeding all previously set short term goals) warrants change in discharge recommendation from STR to home w/ no PT needs  Pt presents w/ no additional acute PT needs at this time, will DC PT  Please reconsult if any changes      Goals   Patient Goals go home   STG Expiration Date 09/18/20   PT Treatment Day 0   Recommendation   PT Discharge Recommendation Return to previous environment with social support Equipment Recommended Other (Comment)  (None)   Barthel Index   Feeding 10   Bathing 0   Grooming Score 5   Dressing Score 10   Bladder Score 10   Bowels Score 10   Toilet Use Score 10   Transfers (Bed/Chair) Score 15   Mobility (Level Surface) Score 15   Stairs Score 5   Barthel Index Score 90     Given the above findings from this re-evaluation, pt appears to be mobilizing per baseline and is able to independently mobilize around patient room and hospital environment  Pt w/ no acute PT needs  Will D/C patient from PT caseload  Please reconsult if any changes      Alize Quiros, PT

## 2020-09-08 NOTE — PROGRESS NOTES
Progress Note Peter Hutton 1934, 80 y o  male MRN: [id number]    Unit/Bed#: S -01 Encounter: [id number]    Primary Care Provider: Marialuisa Pineda MD   Date and time admitted to hospital: 9/4/2020  7:29 PM  * Altered mental state  Assessment & Plan  · Presentation: pulled over by police due to erratic driving  Patient was unable to explain situation  Had slowed speech  Per patient's family, rapid onset of acute confusion noted for the past 2 weeks  Reported that symptoms wax and wane  Currently seems to be oriented and appropriate  · Multifactorial explanation with toxic metabolic features in addition to stroke:  · Hypoglycemia with EMS, blood sugar 50s initially--now resolved  · Also RUTHY on admission  · Also found to have acute occipital stroke  · * did neurology input regarding safety of ongoing driving/pen dot reporting      Stroke (cerebrum) ([facility] [address] )  Assessment & Plan  · MRI:Small early infarcts identified within the left occipital lobe without mass effect or hemorrhagic transformation  · CTA= posterior cerebral moderate stenosis  · Continue Eliquis twice a day (apparently patient was only taking it once a day prior to this event)  Based on age greater than [de-identified] and weight less than 60 kg, the appropriate dose is 2 5 mg  · 2D echocardiogram with EF 60% grade 1 diastolic dysfunction and minimal valvular abnormalities  No vegetation  · Appreciate neurology consult  Seems as if they are planning to continue baby aspirin but defer to them  · Continue statin      Paroxysmal atrial fibrillation (HCC)  Assessment & Plan  · Rate controlled on metoprolol   · Anticoagulated on eliquis    Family reported no missed doses of eliquis, but patient manages his own medications independently  · Off tele now    Type 2 diabetes mellitus [facility])  Assessment & Plan  Lab Results   Component Value Date    HGBA1C 6 8 (H) 09/05/2020       Recent Labs     09/07/20  1127 09/07/20  1546 09/07/20  2030 09/08/20  0703 POCGLU 303* 196* 285* 178*       Blood Sugar Average: Last 72 hrs:  (P) 194     · Hypoglycemic with EMS with blood sugar in 50s  Received D50 in ED  · Home regimen x >1 year: metformin, glimepiride, lantus 10 units daily ---all on hold  · Sugars are mildly elevated at times in the past 24 hours but given his age I would recommend avoiding hypoglycemia  Likely could discharge on just metformin and diet alone      Acute kidney injury superimposed on chronic kidney disease ([facility] [address] )  Assessment & Plan   Creatinine upon admission: 1 52  o Baseline: 1 2; currently 1 23   Secondary to decreased PO intake    Treatment: IV fluids, now completed      Mild protein-calorie malnutrition (HCC)  Assessment & Plan  Malnutrition Findings:   Malnutrition type: Chronic illness  Degree of Malnutrition: Malnutrition of mild degree(related to inadequate intake/advanced age as evidenced by intake inadequate to maintain a healthy wt (BMI 18 5, pt 80% of IBW), muscle depletion noted (clavicles, temples)  Treatment - supplement )    BMI Findings: Body mass index is 17 78 kg/m²  VTE Pharmacologic Prophylaxis:   Pharmacologic: Apixaban (Eliquis)  Mechanical VTE Prophylaxis in Place: Yes    Patient Centered Rounds: Spoke with nursing    Discussions with Specialists or Other Care Team Provider:  Case management    Education and Discussions with Family / Patient:     Time Spent for Care: 25 minutes  More than 50% of total time spent on counseling and coordination of care as described above  Current Length of Stay: 4 day(s)    Current Patient Status: Inpatient   Certification Statement: The patient will continue to require additional inpatient hospital stay due to Awaiting placement    Discharge Plan:  Medically stable for placement today, awaiting feedback from facilities patient chose    Code Status: Level 1 - Full Code    Subjective:   Patient with no complaints or concerns today, uneventful night      Objective: Vitals:   Temp (24hrs), Av 8 °F (36 6 °C), Min:97 7 °F (36 5 °C), Max:97 9 °F (36 6 °C)    Temp:  [97 7 °F (36 5 °C)-97 9 °F (36 6 °C)] 97 9 °F (36 6 °C)  HR:  [55-59] 59  Resp:  [16] 16  BP: (104-135)/(52-60) 133/60  SpO2:  [97 %-99 %] 98 %  Body mass index is 17 78 kg/m²  Input and Output Summary (last 24 hours): Intake/Output Summary (Last 24 hours) at 2020 0826  Last data filed at 2020 0525  Gross per 24 hour   Intake 240 ml   Output 550 ml   Net -310 ml       Physical Exam:     Physical Exam  Vitals signs reviewed  Constitutional:       General: He is not in acute distress  Appearance: Normal appearance  He is not ill-appearing, toxic-appearing or diaphoretic  Eyes:      General: No scleral icterus  Right eye: No discharge  Left eye: No discharge  Cardiovascular:      Rate and Rhythm: Normal rate  Heart sounds: No murmur  Pulmonary:      Effort: Pulmonary effort is normal  No respiratory distress  Breath sounds: Normal breath sounds  No stridor  No wheezing, rhonchi or rales  Abdominal:      General: Bowel sounds are normal  There is no distension  Palpations: Abdomen is soft  Tenderness: There is no abdominal tenderness  Musculoskeletal:      Right lower leg: No edema  Left lower leg: No edema  Skin:     General: Skin is warm and dry  Coloration: Skin is not jaundiced or pale  Findings: No bruising, erythema, lesion or rash  Neurological:      General: No focal deficit present  Mental Status: He is alert and oriented to person, place, and time  Psychiatric:         Mood and Affect: Mood normal          Thought Content:  Thought content normal       Comments: Very pleasant and cooperative         Additional Data:     Labs:    Results from last 7 days   Lab Units 20  0438   WBC Thousand/uL 6 34   HEMOGLOBIN g/dL 11 1*   HEMATOCRIT % 34 2*   PLATELETS Thousands/uL 142*   NEUTROS PCT % 48   LYMPHS PCT % 40 MONOS PCT % 7   EOS PCT % 5     Results from last 7 days   Lab Units 09/08/20  0524  09/04/20  2028   SODIUM mmol/L 140   < > 142   POTASSIUM mmol/L 4 2   < > 4 7   CHLORIDE mmol/L 105   < > 107   CO2 mmol/L 28   < > 32   BUN mg/dL 31*   < > 32*   CREATININE mg/dL 1 23   < > 1 52*   ANION GAP mmol/L 7   < > 3*   CALCIUM mg/dL 8 5   < > 9 3   ALBUMIN g/dL  --   --  4 1   TOTAL BILIRUBIN mg/dL  --   --  0 39   ALK PHOS U/L  --   --  53   ALT U/L  --   --  27   AST U/L  --   --  18   GLUCOSE RANDOM mg/dL 174*   < > 86    < > = values in this interval not displayed  Results from last 7 days   Lab Units 09/08/20  0703 09/07/20  2030 09/07/20  1546 09/07/20  1127 09/07/20  0728 09/06/20  2048 09/06/20  1549 09/06/20  1217 09/06/20  0625 09/05/20  2045 09/05/20  1554 09/05/20  1121   POC GLUCOSE mg/dl 178* 285* 196* 303* 132 179* 181* 339* 126 168* 123 223*     Results from last 7 days   Lab Units 09/05/20  0438   HEMOGLOBIN A1C % 6 8*             * I Have Reviewed All Lab Data Listed Above  * Additional Pertinent Lab Tests Reviewed:  All Labs Within Last 24 Hours Reviewed    Imaging:    Imaging Reports Reviewed Today Include:   Imaging Personally Reviewed by Myself Includes:      Recent Cultures (last 7 days):           Last 24 Hours Medication List:   Current Facility-Administered Medications   Medication Dose Route Frequency Provider Last Rate    acetaminophen  650 mg Oral Q6H PRN COSME Hu      apixaban  2 5 mg Oral BID COSME Hu      atorvastatin  40 mg Oral QPM COSME Hu      fish oil  1,000 mg Oral Daily [address]      insulin lispro  1-5 Units Subcutaneous TID AC Ravi Finn MD      insulin lispro  1-5 Units Subcutaneous HS Ravi Finn MD      metoprolol succinate  50 mg Oral Daily COSME Hu      multivitamin stress formula  1 tablet Oral Daily COSME Hu          Today, Patient Was Seen By: Tashi Velez PA-C    ** Please Note: Dictation voice to text software may have been used in the creation of this document   **

## 2020-09-08 NOTE — DISCHARGE SUMMARY
Please see progress note from earlier today; below is his discharge summary:  Discharging Physician / Practitioner: Kike Harvey PA-C  PCP: Carla Martinez MD  Admission Date:   Admission Orders (From admission, onward)     Ordered        09/04/20 2253  Inpatient Admission (expected length of stay for this patient Order details is greater than two midnights)  Once                   Discharge Date: 09/08/20    Resolved Problems  Date Reviewed: 9/8/2020    None          Consultations During Hospital Stay:  · neurology    Procedures Performed:   · MRI brain  · CTA head/neck  · CT head  · echo    Significant Findings / Test Results:   · Stroke  · RUTHY  · hypoglycemia    Incidental Findings:   · none    Test Results Pending at Discharge (will require follow up):   · none     Outpatient Tests Requested:  ·     Complications:  none    Reason for Admission: confusion    Hospital Course:     Ivett Coffey is a 80 y o  male patient who originally presented to the hospital on 9/4/2020 due to being pulled over by police due to erratic driving  His speech was slow at the time and he was noted to have low blood sugar of 50  Apparently he had been at the bank for many hours unexpectedly and did not eat, which was likely the culprit for his hypoglycemia  In addition, he had acute kidney injury on his labs on admission which resolved with IV fluids  His Amaryl, metformin, and Lantus were held and his blood sugars overall remained stable  At discharge, we have recommended resuming only metformin and diabetic diet as his A1c is excellent at 6 8 and with his advanced age, we want to prevent further events of hypoglycemia  During his workup for the initial altered mental status, patient also had a CTA which showed posterior cerebral moderate degree of stenosis and an MRI of the brain which showed small early infarcts in the left occipital lobe    Patient is known to have AFib and apparently he had misunderstood his dosing of Eliquis and was only taking it once a day rather than twice a day  Lipitor was also added to his regimen and patient understands the importance of compliance with twice a day Eliquis dosing  He was seen by physical and occupational therapy and initially was recommended for rehab but patient improved significantly over the course of his hospitalization and by the time of his discharge was felt to be stable for discharge to home with family support and home services as needed  Please see above list of diagnoses and related plan for additional information  Condition at Discharge: stable     Discharge Day Visit / Exam:     * Please refer to separate progress note for these details *    Discussion with Family: called kevin with update    Discharge instructions/Information to patient and family:   See after visit summary for information provided to patient and family  Provisions for Follow-Up Care:  See after visit summary for information related to follow-up care and any pertinent home health orders  Disposition: home with daughter    Planned Readmission: none     Discharge Statement:  I spent 40 minutes discharging the patient  This time was spent on the day of discharge  I had direct contact with the patient on the day of discharge  Greater than 50% of the total time was spent examining patient, answering all patient questions, arranging and discussing plan of care with patient as well as directly providing post-discharge instructions  Additional time then spent on discharge activities  Spoke with neurology and case mgmt    Discharge Medications:  See after visit summary for reconciled discharge medications provided to patient and family        ** Please Note: This note has been constructed using a voice recognition system **

## 2020-09-08 NOTE — ASSESSMENT & PLAN NOTE
 Creatinine upon admission: 1 52  o Baseline: 1 2; currently 1 23   Secondary to decreased PO intake    Treatment: IV fluids, now completed

## 2020-09-08 NOTE — ASSESSMENT & PLAN NOTE
Lab Results   Component Value Date    HGBA1C 6 8 (H) 09/05/2020       Recent Labs     09/07/20  1127 09/07/20  1546 09/07/20  2030 09/08/20  0703   POCGLU 303* 196* 285* 178*       Blood Sugar Average: Last 72 hrs:  (P) 194     · Hypoglycemic with EMS with blood sugar in 50s  Received D50 in ED  · Home regimen x >1 year: metformin, glimepiride, lantus 10 units daily ---all on hold  · Sugars are mildly elevated at times in the past 24 hours but given his age I would recommend avoiding hypoglycemia    Likely could discharge on just metformin and diet alone

## 2020-09-08 NOTE — NURSING NOTE
Pt IV removed  AVS reviewed with pt and daughter at bedside  Pt leaving in stable condition walking with daughter  Pt script for lipitor given to dtr, as well as stroke education given to both pt and family member    Jeanette Pedroza RN

## 2020-09-08 NOTE — PLAN OF CARE
Problem: Nutrition/Hydration-ADULT  Goal: Nutrient/Hydration intake appropriate for improving, restoring or maintaining nutritional needs  Description: Monitor and assess patient's nutrition/hydration status for malnutrition  Collaborate with interdisciplinary team and initiate plan and interventions as ordered  Monitor patient's weight and dietary intake as ordered or per policy  Utilize nutrition screening tool and intervene as necessary  Determine patient's food preferences and provide high-protein, high-caloric foods as appropriate  INTERVENTIONS:  - Monitor oral intake, urinary output, labs, and treatment plans  - Assess nutrition and hydration status and recommend course of action  - Evaluate amount of meals eaten  - Assist patient with eating if necessary   - Allow adequate time for meals  - Recommend/ encourage appropriate diets, oral nutritional supplements, and vitamin/mineral supplements  - Order, calculate, and assess calorie counts as needed  - Recommend, monitor, and adjust tube feedings and TPN/PPN based on assessed needs  - Assess need for intravenous fluids  - Provide specific nutrition/hydration education as appropriate  - Include patient/family/caregiver in decisions related to nutrition  Outcome: Progressing     Problem: Neurological Deficit  Goal: Neurological status is stable or improving  Description: Interventions:  - Monitor and assess patient's level of consciousness, motor function, sensory function, and level of assistance needed for ADLs  - Monitor and report changes from baseline  Collaborate with interdisciplinary team to initiate plan and implement interventions as ordered  - Provide and maintain a safe environment  - Consider seizure precautions  - Consider fall precautions  - Consider aspiration precautions  - Consider bleeding precautions  Outcome: Progressing     Problem:  Activity Intolerance/Impaired Mobility  Goal: Mobility/activity is maintained at optimum level for patient  Description: Interventions:  - Assess and monitor patient  barriers to mobility and need for assistive/adaptive devices  - Assess patient's emotional response to limitations  - Collaborate with interdisciplinary team and initiate plans and interventions as ordered  - Encourage independent activity per ability   - Maintain proper body alignment  - Perform active/passive rom as tolerated/ordered  - Plan activities to conserve energy   - Turn patient as appropriate  Outcome: Progressing     Problem: Communication Impairment  Goal: Ability to express needs and understand communication  Description: Assess patient's communication skills and ability to understand information  Patient will demonstrate use of effective communication techniques, alternative methods of communication and understanding even if not able to speak  - Encourage communication and provide alternate methods of communication as needed  - Collaborate with case management/ for discharge needs  - Include patient/family/caregiver in decisions related to communication  Outcome: Progressing     Problem: Potential for Aspiration  Goal: Non-ventilated patient's risk of aspiration is minimized  Description: Assess and monitor vital signs, respiratory status, and labs (WBC)  Monitor for signs of aspiration (tachypnea, cough, rales, wheezing, cyanosis, fever)  - Assess and monitor patient's ability to swallow  - Place patient up in chair to eat if possible  - HOB up at 90 degrees to eat if unable to get patient up into chair   - Supervise patient during oral intake  - Instruct patient/ family to take small bites  - Instruct patient/ family to take small single sips when taking liquids    - Follow patient-specific strategies generated by speech pathologist   Outcome: Progressing  Goal: Ventilated patient's risk of aspiration is minimized  Description: Assess and monitor vital signs, respiratory status, airway cuff pressure, and labs (WBC)  Monitor for signs of aspiration (tachypnea, cough, rales, wheezing, cyanosis, fever)  - Elevate head of bed 30 degrees if patient has tube feeding   - Monitor tube feeding  Outcome: Progressing     Problem: Nutrition  Goal: Nutrition/Hydration status is improving  Description: Monitor and assess patient's nutrition/hydration status for malnutrition (ex- brittle hair, bruises, dry skin, pale skin and conjunctiva, muscle wasting, smooth red tongue, and disorientation)  Collaborate with interdisciplinary team and initiate plan and interventions as ordered  Monitor patient's weight and dietary intake as ordered or per policy  Utilize nutrition screening tool and intervene per policy  Determine patient's food preferences and provide high-protein, high-caloric foods as appropriate  - Assist patient with eating   - Allow adequate time for meals   - Encourage patient to take dietary supplement as ordered  - Collaborate with clinical nutritionist   - Include patient/family/caregiver in decisions related to nutrition  Outcome: Progressing     Problem: SAFETY ADULT  Goal: Patient will remain free of falls  Description: INTERVENTIONS:  - Assess patient frequently for physical needs  -  Identify cognitive and physical deficits and behaviors that affect risk of falls    -  [place] fall precautions as indicated by assessment   - Educate patient/family on patient safety including physical limitations  - Instruct patient to call for assistance with activity based on assessment  - Modify environment to reduce risk of injury  - Consider OT/PT consult to assist with strengthening/mobility  Outcome: Progressing  Goal: Maintain or return to baseline ADL function  Description: INTERVENTIONS:  -  Assess patient's ability to carry out ADLs; assess patient's baseline for ADL function and identify physical deficits which impact ability to perform ADLs (bathing, care of mouth/teeth, toileting, grooming, dressing, etc )  - Assess/evaluate cause of self-care deficits   - Assess range of motion  - Assess patient's mobility; develop plan if impaired  - Assess patient's need for assistive devices and provide as appropriate  - Encourage maximum independence but intervene and supervise when necessary  - Involve family in performance of ADLs  - Assess for home care needs following discharge   - Consider OT consult to assist with ADL evaluation and planning for discharge  - Provide patient education as appropriate  Outcome: Progressing  Goal: Maintain or return mobility status to optimal level  Description: INTERVENTIONS:  - Assess patient's baseline mobility status (ambulation, transfers, stairs, etc )    - Identify cognitive and physical deficits and behaviors that affect mobility  - Identify mobility aids required to assist with transfers and/or ambulation (gait belt, sit-to-stand, lift, walker, cane, etc )  - [place] fall precautions as indicated by assessment  - Record patient progress and toleration of activity level on Mobility SBAR; progress patient to next Phase/Stage  - Instruct patient to call for assistance with activity based on assessment  - Consider rehabilitation consult to assist with strengthening/weightbearing, etc   Outcome: Progressing     Problem: DISCHARGE PLANNING  Goal: Discharge to home or other facility with appropriate resources  Description: INTERVENTIONS:  - Identify barriers to discharge w/patient and caregiver  - Arrange for needed discharge resources and transportation as appropriate  - Identify discharge learning needs (meds, wound care, etc )  - Arrange for interpretive services to assist at discharge as needed  - Refer to Case Management Department for coordinating discharge planning if the patient needs post-hospital services based on physician/advanced practitioner order or complex needs related to functional status, cognitive ability, or social support system  Outcome: Progressing Problem: Knowledge Deficit  Goal: Patient/family/caregiver demonstrates understanding of disease process, treatment plan, medications, and discharge instructions  Description: Complete learning assessment and assess knowledge base    Interventions:  - Provide teaching at level of understanding  - Provide teaching via preferred learning methods  Outcome: Progressing

## 2020-09-08 NOTE — CASE MANAGEMENT
As per SLIM, patient is medically stable for discharge today  LOS: 4 DAYS  PATIENT IS NOT A BUNDLE  PATIENT IS NOT A READMISSION  CM met with patient at bedside, patient alert and oriented and seated at edge of bed  Patient lives alone in a 3 story home in [facility]  There are 6 COY to the 1st level which includes laundry and 1/2 bath, then a full flight ot the main living level on the second floor where patient spends majority of his time  Patient denies the use of any DME and reports he's able to navigate his home without concern  Patient reports he's independent with all ADLs  Patient has history of HHC through [facility] for SN/PT/OT but is not current with their services  Patient denies history of SNF  Patient utilizes [facility] on [address]  Patient has Rx plan and is able to afford all copays  Patient denies history of MH/substance use  Patient identifies his daughter Jaskaran Hernandez as his POA and has AD as well  Patient receives SSI as well as a pension from [employer] and he does drive  Patient reports his granddaughter "Cleopatra" lives 1 5 blocks away and checks on him frequently  Patient's daughter Sarah Lopez lives two hours away but she calls and checks on patient daily  CM reviewed PT/OT evaluation and change in recommendation from SNF to Home with [facility]  Patient is happy with same and asked for CM to contact his daughter Sarah Lopez to discuss further  CM called and spoke with Sarah Lopez to review discharge plan  She is aware of change in therapy recommendations and is happy with same  She stated she actually plans to  patient upon discharge and will have him come stay with her for a week or so to see how he's doing before sending him back home alone  Patient's daughter declining [facility] at this time as she said she can do exercises with patient at home  Should Sarah Lopez feel patient still requires [facility] upon return home, she will contact his PCP to arrange same   Sarah Lopez will arrive at 6 pm to transport patient home with her  SLIM, RAIZA Aponte [place] aware of plans and RN to inform patient of same  CM reviewed discharge planning process including the following: identifying caregivers at home, preference for d/c planning needs, availability of treatment team to discuss questions or concerns patient and/or family may have regarding diagnosis, plan of care, old or new medications and discharge planning   CM will continue to follow for care coordination and update assessment as appropriate

## 2020-09-08 NOTE — PLAN OF CARE
Problem: PHYSICAL THERAPY ADULT  Goal: Performs mobility at highest level of function for planned discharge setting  See evaluation for individualized goals  Description: Treatment/Interventions: Functional transfer training, LE strengthening/ROM, Elevations, Therapeutic exercise, Endurance training, Cognitive reorientation, Patient/family training, Equipment eval/education, Bed mobility, Gait training  Equipment Recommended: Other (Comment)(roller walker)       See flowsheet documentation for full assessment, interventions and recommendations  Outcome: Adequate for Discharge  Note: Prognosis: Fair  Problem List: Decreased strength, Decreased cognition  Assessment: Patient seen for physical therapy re-evaluation due to reported improvement in mobility status per RN  They continue to present w/ dx of AMS and stroke  Upon re-evaluation, pt continues to present with the following impairments: weakness, impaired cognition and gait deviations  These impairments are still evident in findings from physical examination (weakness), and Barthel Index: 90/100 and 4 Item mDGI = 11/12 (<10/12 is at increased risk of falls)  Pt needed input for task input at times during session  Presently, pt is able to ambulate around room and hallways independently without difficulty  His overall functional improvements since IE (including exceeding all previously set short term goals) warrants change in discharge recommendation from STR to home w/ no PT needs  Pt presents w/ no additional acute PT needs at this time, will DC PT  Please reconsult if any changes  PT Discharge Recommendation: Return to previous environment with social support          See flowsheet documentation for full assessment

## 2020-09-08 NOTE — OCCUPATIONAL THERAPY NOTE
[address] Progress Note     Patient Name: Shahzad Hutton  NAASF'C Date: 2020  Problem List  Principal Problem:    Altered mental state  Active Problems:    Type 2 diabetes mellitus ([facility] [address] )    Acute kidney injury superimposed on chronic kidney disease ([facility] [address] )    Paroxysmal atrial fibrillation (HCC)    Stroke (cerebrum) (HCC)    Mild protein-calorie malnutrition (HCC)       Pt seen for OT tx session from [id number], entered into flowsheet at 1200       20 1200   Restrictions/Precautions   Other Precautions Cognitive; Fall Risk   Pain Assessment   Pain Assessment Tool Pain Assessment not indicated - pt denies pain   Pain Score No Pain   Lifestyle   Reciprocal Relationships daughter, granddaughter    Service to Others retired  - worked at [employer] pt reports enjoying playing [employer]    Bed Mobility   Supine to Sit Unable to assess   Sit to Supine Unable to assess   Additional Comments pt OOB in bathroom upon arrival  Pt seated OOB in recliner chair at end of session w/ call bell in reach and all needs met  Transfers   Sit to Stand 6  Modified independent   Additional items Armrests   Stand to Sit 6  Modified independent   Additional items Armrests   Functional Mobility   Functional Mobility 6  Modified independent   Additional Comments Pt in bathroom upon therapist arrival  Pt required no phys A to ambulate from bathroom to recliner chair  Additional items   (no DME )   Cognition   Overall Cognitive Status Impaired  (see details below )   Arousal/Participation Alert; Cooperative   Attention Within functional limits   Orientation Level Oriented to person   Memory Decreased short term memory   Following Commands Follows one step commands without difficulty   Comments Pt identified by full name and   Pt agreeable and motivated to participate in session and complete ACLS   Pt able to engage in normal conversation and was oriented to person and generally oriented to time/situation  Please see end of progress note for ACLS score breakdown  Cognition Assessment Tools ACLS   Score 4 4   Assessment   Assessment Pt seen for skilled OT tx session from [id number] [phone]  Pt agreeable to participate in session focusing on ADL retaining and further cognitive testing  Pt in bathroom upon therapist arrival  Pt required no phys A to complete toileting/personal hygiene and no phys A to ambulate from bathroom to couch  Pt was Mod I for STS transitions w/ + time  Pt donned pants w/ MOD I seated from the couch w/ set-up and + time  Therapist introduced/administered the [employer] Level Screen (ACLS)  Pt reported he would like to try the assessment  Pt scored a 4 4/6 indicating that it is recommended that the person lives w/ someone who does a daily check on the environment to remove any safety hazards and problems solves when minor changes in the home occur  Please see end of progress note for scoring breakdown  Pt demonstrated improved activity tolerance and engagement  At end of session pt was seated on couch w/ call bell in reach and all needs met  Recommendation at time of d/c would be for pt to return to PLOF w/ home OT and increased assistance /daily checks  Recommend OPOT for Fitness to Drive assessment  Plan   Treatment Interventions ADL retraining;Functional transfer training;Cognitive reorientation;Continued evaluation; Activityengagement   Recommendation   OT Discharge Recommendation Home with skilled therapy  (+ increased A / daily checks )   OT - OK to Discharge   (when medically stable )   Barthel Index   Feeding 10   Bathing 0   Grooming Score 5   Dressing Score 10   Bladder Score 10   Bowels Score 10   Toilet Use Score 10   Transfers (Bed/Chair) Score 15   Mobility (Level Surface) Score 10   Stairs Score 0   Barthel Index Score 80   Modified Billings Scale   Modified Billings Scale 3     4 4    Administered [facility] Cognitive Level Screen (ACLS)    Pt scored 4 4/6 0 indicating it is recommended that the person live with someone who does a daily check on the environment to remove any safety hazards and solve problems when minor changes in the home occur  The person may be alone for part of the day with a pre-established procedure for getting help from a neighbor  Behavior:  Knows day/date  Follows routine sequence of activities  Will learn schedule and follow daily routine  Hazards are not anticipated  Memorization is slow  Will need long term repetitive training for all new tasks  May become upset if routine is altered  May seek assist if lost   Do not expect to be aware of needs of others  May need reminders to keep appointments  Grooming:  Cole, brushes and styles hair  Applies makeup  May insist on familiar products  Finds supplies in familiar locations  May be unable to master use of new tools  Hazards are not anticipated  Dressing: Finds garments in familiar locations  Initiates dressing at usual times  Selects familiar combinations of outfits and may wear same outfit over and over  Resists new combinations  May fail to consider weather conditions, season or occasion when selecting clothing  May argue with suggested corrections of errors  Bathing:  Initiates bath/shower at customary times and follows typical routine  May collect supplies from a familiar location  May not vary rate  May want to use same products  May use excessive amounts of product  May have difficulty opening small or unusual containers  May leave towels on floor  Protect from unseen hazards (wet floors, electrical appliances near water)  Walking/exercising:  Ambulates within fitness capacity in neighborhood  Chooses to go by familiar routes  May fail to attend to signs or activities outside visual field  Needs new route identified by others and may learn after several weeks of practice    May become anxious in high stimulus environments (malls, airports, [place])  Eating: May notice and clean highly visible dropped food items  May not be able to eat and converse at the same time  May resist changes in diet and menu  Watch handling of hot foods/liquids and heavy items  Toileting: Follows a routine of toileting in a familiar environment  Needs to have public rest rooms pointed out  May use too much paper  Does not consider needs of others  May spend excessive time in rest room  Medications: May follow routine rigidly and resist changes in prescriptions based on erroneous beliefs of effects  Does not note adverse side effects  Does not anticipate need to renew prescriptions  May be able to open child proof containers  Can use DAILY pill box marked with day/time (filled by another person)  Use of adaptive equipment:  May be trained to use adaptive equipment that requires a sequence of familiar actions  Does not anticipate hazards  Once learned, may resist changes in equipment  May abandon use of assistive device or use in unsafe manner  Housekeeping:  Sweeps, polishes and puts away objects to match previous performance  Fails to see dirt or dust in corners  May use excessive amounts of , polish or water  May resist changes in routine or products used  May fail to differentiate between similar cleaning products  Food preparation:  May follow a fixed diet and go hungry if usual food items are not available  Does not check inventory and may run out of essentials frequently  Does not consider nutritional needs  Goes to familiar restaurants of grocery stores  Is able to prepare simple hot/cold dishes  Spending money:  Manages routine purchases for immediate needs  May count cash very slowly  May use credit cards or checks  May need assistance for making monthly budget  May not remember to account for taxes or tips  Can manage a daily allowance  Shopping:  Goes to familiar stores for routine items    Does not compare product prices or quality  May not consider cost of item in relation to overall budget  May overspend  Laundry:  Initiates and completes laundry routine at usual time  May sort items by color  May follow schedule rigidly  May forget to clean lint traps  May forget to turn iron off  Traveling:  Needs new routes and travel procedures identified by others  May express interest in driving a car but fails to attend to all environmental cues to do so safely  May not allow adequate time for travel  Telephone:  Writes down messages and new numbers slowly  May attempt to use an address book  May make calls at odd hours without consideration of others schedule or cost of call  May run up large telephone bills  Driving: Should NOT operate a motor vehicle        Yovana Quinones, OTS

## 2020-09-08 NOTE — PLAN OF CARE
Problem: Nutrition/Hydration-ADULT  Goal: Nutrient/Hydration intake appropriate for improving, restoring or maintaining nutritional needs  Description: Monitor and assess patient's nutrition/hydration status for malnutrition  Collaborate with interdisciplinary team and initiate plan and interventions as ordered  Monitor patient's weight and dietary intake as ordered or per policy  Utilize nutrition screening tool and intervene as necessary  Determine patient's food preferences and provide high-protein, high-caloric foods as appropriate  INTERVENTIONS:  - Monitor oral intake, urinary output, labs, and treatment plans  - Assess nutrition and hydration status and recommend course of action  - Evaluate amount of meals eaten  - Assist patient with eating if necessary   - Allow adequate time for meals  - Recommend/ encourage appropriate diets, oral nutritional supplements, and vitamin/mineral supplements  - Order, calculate, and assess calorie counts as needed  - Recommend, monitor, and adjust tube feedings and TPN/PPN based on assessed needs  - Assess need for intravenous fluids  - Provide specific nutrition/hydration education as appropriate  - Include patient/family/caregiver in decisions related to nutrition  Outcome: Progressing     Problem: Neurological Deficit  Goal: Neurological status is stable or improving  Description: Interventions:  - Monitor and assess patient's level of consciousness, motor function, sensory function, and level of assistance needed for ADLs  - Monitor and report changes from baseline  Collaborate with interdisciplinary team to initiate plan and implement interventions as ordered  - Provide and maintain a safe environment  - Consider seizure precautions  - Consider fall precautions  - Consider aspiration precautions  - Consider bleeding precautions  Outcome: Progressing     Problem:  Activity Intolerance/Impaired Mobility  Goal: Mobility/activity is maintained at optimum level for patient  Description: Interventions:  - Assess and monitor patient  barriers to mobility and need for assistive/adaptive devices  - Assess patient's emotional response to limitations  - Collaborate with interdisciplinary team and initiate plans and interventions as ordered  - Encourage independent activity per ability   - Maintain proper body alignment  - Perform active/passive rom as tolerated/ordered  - Plan activities to conserve energy   - Turn patient as appropriate  Outcome: Progressing     Problem: Communication Impairment  Goal: Ability to express needs and understand communication  Description: Assess patient's communication skills and ability to understand information  Patient will demonstrate use of effective communication techniques, alternative methods of communication and understanding even if not able to speak  - Encourage communication and provide alternate methods of communication as needed  - Collaborate with case management/ for discharge needs  - Include patient/family/caregiver in decisions related to communication  Outcome: Progressing     Problem: Potential for Aspiration  Goal: Non-ventilated patient's risk of aspiration is minimized  Description: Assess and monitor vital signs, respiratory status, and labs (WBC)  Monitor for signs of aspiration (tachypnea, cough, rales, wheezing, cyanosis, fever)  - Assess and monitor patient's ability to swallow  - Place patient up in chair to eat if possible  - HOB up at 90 degrees to eat if unable to get patient up into chair   - Supervise patient during oral intake  - Instruct patient/ family to take small bites  - Instruct patient/ family to take small single sips when taking liquids    - Follow patient-specific strategies generated by speech pathologist   Outcome: Progressing  Goal: Ventilated patient's risk of aspiration is minimized  Description: Assess and monitor vital signs, respiratory status, airway cuff pressure, and labs (WBC)  Monitor for signs of aspiration (tachypnea, cough, rales, wheezing, cyanosis, fever)  - Elevate head of bed 30 degrees if patient has tube feeding   - Monitor tube feeding  Outcome: Progressing     Problem: Nutrition  Goal: Nutrition/Hydration status is improving  Description: Monitor and assess patient's nutrition/hydration status for malnutrition (ex- brittle hair, bruises, dry skin, pale skin and conjunctiva, muscle wasting, smooth red tongue, and disorientation)  Collaborate with interdisciplinary team and initiate plan and interventions as ordered  Monitor patient's weight and dietary intake as ordered or per policy  Utilize nutrition screening tool and intervene per policy  Determine patient's food preferences and provide high-protein, high-caloric foods as appropriate  - Assist patient with eating   - Allow adequate time for meals   - Encourage patient to take dietary supplement as ordered  - Collaborate with clinical nutritionist   - Include patient/family/caregiver in decisions related to nutrition  Outcome: Progressing     Problem: SAFETY ADULT  Goal: Patient will remain free of falls  Description: INTERVENTIONS:  - Assess patient frequently for physical needs  -  Identify cognitive and physical deficits and behaviors that affect risk of falls    -  [place] fall precautions as indicated by assessment   - Educate patient/family on patient safety including physical limitations  - Instruct patient to call for assistance with activity based on assessment  - Modify environment to reduce risk of injury  - Consider OT/PT consult to assist with strengthening/mobility  Outcome: Progressing  Goal: Maintain or return to baseline ADL function  Description: INTERVENTIONS:  -  Assess patient's ability to carry out ADLs; assess patient's baseline for ADL function and identify physical deficits which impact ability to perform ADLs (bathing, care of mouth/teeth, toileting, grooming, dressing, etc )  - Assess/evaluate cause of self-care deficits   - Assess range of motion  - Assess patient's mobility; develop plan if impaired  - Assess patient's need for assistive devices and provide as appropriate  - Encourage maximum independence but intervene and supervise when necessary  - Involve family in performance of ADLs  - Assess for home care needs following discharge   - Consider OT consult to assist with ADL evaluation and planning for discharge  - Provide patient education as appropriate  Outcome: Progressing  Goal: Maintain or return mobility status to optimal level  Description: INTERVENTIONS:  - Assess patient's baseline mobility status (ambulation, transfers, stairs, etc )    - Identify cognitive and physical deficits and behaviors that affect mobility  - Identify mobility aids required to assist with transfers and/or ambulation (gait belt, sit-to-stand, lift, walker, cane, etc )  - [place] fall precautions as indicated by assessment  - Record patient progress and toleration of activity level on Mobility SBAR; progress patient to next Phase/Stage  - Instruct patient to call for assistance with activity based on assessment  - Consider rehabilitation consult to assist with strengthening/weightbearing, etc   Outcome: Progressing     Problem: DISCHARGE PLANNING  Goal: Discharge to home or other facility with appropriate resources  Description: INTERVENTIONS:  - Identify barriers to discharge w/patient and caregiver  - Arrange for needed discharge resources and transportation as appropriate  - Identify discharge learning needs (meds, wound care, etc )  - Arrange for interpretive services to assist at discharge as needed  - Refer to Case Management Department for coordinating discharge planning if the patient needs post-hospital services based on physician/advanced practitioner order or complex needs related to functional status, cognitive ability, or social support system  Outcome: Progressing Problem: Knowledge Deficit  Goal: Patient/family/caregiver demonstrates understanding of disease process, treatment plan, medications, and discharge instructions  Description: Complete learning assessment and assess knowledge base    Interventions:  - Provide teaching at level of understanding  - Provide teaching via preferred learning methods  Outcome: Progressing

## 2020-09-08 NOTE — ASSESSMENT & PLAN NOTE
· Presentation: pulled over by police due to erratic driving  Patient was unable to explain situation  Had slowed speech  Per patient's family, rapid onset of acute confusion noted for the past 2 weeks  Reported that symptoms wax and wane    Currently seems to be oriented and appropriate  · Multifactorial explanation with toxic metabolic features in addition to stroke:  · Hypoglycemia with EMS, blood sugar 50s initially--now resolved  · Also RUTHY on admission  · Also found to have acute occipital stroke  · * did neurology input regarding safety of ongoing driving/pen dot reporting

## 2020-09-08 NOTE — ASSESSMENT & PLAN NOTE
Malnutrition Findings:   Malnutrition type: Chronic illness  Degree of Malnutrition: Malnutrition of mild degree(related to inadequate intake/advanced age as evidenced by intake inadequate to maintain a healthy wt (BMI 18 5, pt 80% of IBW), muscle depletion noted (clavicles, temples)  Treatment - supplement )    BMI Findings: Body mass index is 17 78 kg/m²

## 2020-09-11 ENCOUNTER — TELEPHONE (OUTPATIENT)
Dept: NEUROLOGY | Facility: CLINIC | Age: 85
End: 2020-09-11

## 2020-09-11 LAB — VIT B1 BLD-SCNC: 103.8 NMOL/L (ref 66.5–200)

## 2020-09-11 NOTE — TELEPHONE ENCOUNTER
----- Message from An [employer] sent at 9/5/2020  1:46 PM EDT -----  Regarding: HFU  Diagnosis/reason for follow-up: Stroke  Subspecialty for follow-up: Stroke  Recommending timing for HFU: 4 weeks  Existing neurologist: None  Tests/labs/imaging ordered: None  Orders placed electronically: None  Additional notes: None    Thank you!

## 2020-09-15 ENCOUNTER — TELEPHONE (OUTPATIENT)
Dept: NEUROLOGY | Facility: CLINIC | Age: 85
End: 2020-09-15

## 2020-09-15 NOTE — TELEPHONE ENCOUNTER
Post CVA Discharge Follow Up    Hospitalization: 9/4-9/8  The purpose of this phone call is to assess patient's general wellbeing or for any assistance needed with follow-up care  Called patient at [phone] with no answer  Left message on voicemail box for call back       *No communication consent form on file to contact additional family/friends

## 2020-09-15 NOTE — UTILIZATION REVIEW
Please send an updated determination on this case    Notification of Discharge  This is a Notification of Discharge from our facility [address]  Please be advised that this patient has been discharge from our facility  Below you will find the admission and discharge date and time including the patients disposition  PRESENTATION DATE: 9/4/2020  7:29 PM  OBS ADMISSION DATE:   IP ADMISSION DATE: 9/4/20 2252   DISCHARGE DATE: 9/8/2020  7:26 PM  DISPOSITION: Home/Self Care Home/Self Care   Admission Orders listed below:  Admission Orders (From admission, onward)     Ordered        09/04/20 2253  Inpatient Admission (expected length of stay for this patient Order details is greater than two midnights)  Once                   Please contact the UR Department if additional information is required to close this patient's authorization/case  [address] [facility] Utilization Review Department  Main: [phone] x carefully listen to the prompts  All voicemails are confidential   [email or web address]  Send all requests for admission clinical reviews, approved or denied determinations and any other requests to dedicated fax number below belonging to the campus where the patient is receiving treatment   List of dedicated fax numbers:  [address] [place] [address] DENIALS (Administrative/Medical Necessity) [phone]   [address]  (Maternity/NICU/Pediatrics) [phone]   Dilma Tucker [phone]   Bertrand Gregory [phone]   Giuliano Childs [phone]   [address] [phone]   [facility]  [phone]   [address] [facility] [address] [facility]  [address] [facility] [address] [facility] [phone]

## 2020-09-24 ENCOUNTER — TELEPHONE (OUTPATIENT)
Dept: NEUROLOGY | Facility: CLINIC | Age: 85
End: 2020-09-24

## 2020-09-24 NOTE — TELEPHONE ENCOUNTER
Left message for patient confirming the appointment on 10/8/20 at 12:15pm with Arsh Kim in the [facility] office  Asking for patient to call the office to confirm the appointment    Please discuss and document

## 2020-09-30 NOTE — DISCHARGE INSTR - AVS FIRST PAGE
Dear Jethro Vazquez,     It was our pleasure to care for you here at [facility]  It is our hope that we were always able to exceed the expected standards for your care during your stay  You were hospitalized due to confusion possibly due to low blood sugar and dehydration but also with new strokes  You were cared for on the 3rd floor by Jeremías Pete PA-C under the service of Isaac Carrera MD with the [facility] who covers for your primary care physician (PCP), Marialuisa Pineda MD, while you were hospitalized  If you have any questions or concerns related to this hospitalization, you may contact us at [phone]  For follow up as well as any medication refills, we recommend that you follow up with your primary care physician  A registered nurse will reach out to you by phone within a few days after your discharge to answer any additional questions that you may have after going home  However, at this time we provide for you here, the most important instructions / recommendations at discharge:     · Notable Medication Adjustments -   · Stop amaryl and lantus for now  Monitor blood sugars at home and if they are consistently running greater than 250, resume Lantus at 5 units at bedtime  · Be sure to take your Eliquis twice a day every day and do not miss any doses  · Take Lipitor 40 mg daily  · Testing Required after Discharge -   ·   · Important follow up information -   · No driving until seen by neurology  · Other Instructions -   · You are eligible for home services if you choose  · Please review this entire after visit summary as additional general instructions including medication list, appointments, activity, diet, any pertinent wound care, and other additional recommendations from your care team that may be provided for you        Sincerely,     Jeremías Pete PA-C
Adequate: hears normal conversation without difficulty

## 2020-10-02 ENCOUNTER — TELEPHONE (OUTPATIENT)
Dept: NEUROLOGY | Facility: CLINIC | Age: 85
End: 2020-10-02

## 2020-10-08 ENCOUNTER — OFFICE VISIT (OUTPATIENT)
Dept: NEUROLOGY | Facility: CLINIC | Age: 85
End: 2020-10-08
Payer: COMMERCIAL

## 2020-10-08 VITALS
HEART RATE: 60 BPM | BODY MASS INDEX: 18.77 KG/M2 | WEIGHT: 116.8 LBS | RESPIRATION RATE: 14 BRPM | HEIGHT: 66 IN | DIASTOLIC BLOOD PRESSURE: 65 MMHG | TEMPERATURE: 97.5 F | SYSTOLIC BLOOD PRESSURE: 154 MMHG

## 2020-10-08 DIAGNOSIS — I48.0 PAROXYSMAL ATRIAL FIBRILLATION (HCC): ICD-10-CM

## 2020-10-08 DIAGNOSIS — I63.412 CEREBROVASCULAR ACCIDENT (CVA) DUE TO EMBOLISM OF LEFT MIDDLE CEREBRAL ARTERY (HCC): Primary | ICD-10-CM

## 2020-10-08 PROCEDURE — 99215 OFFICE O/P EST HI 40 MIN: CPT | Performed by: NURSE PRACTITIONER

## 2020-10-08 RX ORDER — PEN NEEDLE, DIABETIC 32GX 5/32"
NEEDLE, DISPOSABLE MISCELLANEOUS
COMMUNITY
Start: 2020-08-07

## 2020-10-08 RX ORDER — SYRING-NEEDL,DISP,INSUL,0.3 ML 31GX15/64"
SYRINGE, EMPTY DISPOSABLE MISCELLANEOUS
COMMUNITY
Start: 2020-07-02

## 2020-10-12 ENCOUNTER — TELEPHONE (OUTPATIENT)
Dept: NEUROLOGY | Facility: CLINIC | Age: 85
End: 2020-10-12

## 2020-10-13 DIAGNOSIS — R40.4 TRANSIENT ALTERATION OF AWARENESS: Primary | ICD-10-CM

## 2020-10-13 DIAGNOSIS — I63.412 CEREBROVASCULAR ACCIDENT (CVA) DUE TO EMBOLISM OF LEFT MIDDLE CEREBRAL ARTERY (HCC): ICD-10-CM

## 2020-11-20 ENCOUNTER — OFFICE VISIT (OUTPATIENT)
Dept: OCCUPATIONAL THERAPY | Facility: CLINIC | Age: 85
End: 2020-11-20
Payer: COMMERCIAL

## 2020-11-20 DIAGNOSIS — R41.82 ALTERED MENTAL STATUS, UNSPECIFIED ALTERED MENTAL STATUS TYPE: Primary | ICD-10-CM

## 2020-11-20 PROCEDURE — 97165 OT EVAL LOW COMPLEX 30 MIN: CPT

## 2020-11-20 PROCEDURE — 96125 COGNITIVE TEST BY HC PRO: CPT

## 2020-11-25 ENCOUNTER — TELEPHONE (OUTPATIENT)
Dept: NEUROLOGY | Facility: CLINIC | Age: 85
End: 2020-11-25

## 2021-01-13 NOTE — PROGRESS NOTES
Patient ID: Eron Sumner is a 80 y o  male  With CKD stage 3, AFib on Eliquis who presented initially to the hospital altered mental status after being pulled over by police due to erratic driving  Workup noted to have embolic infarct    Assessment/Plan:   cognitive deficit  --  Patient underwent fitness to drive evaluation, coring cognitively at a 77% probability of failing a specialized on the road testing  This indicates a cognitive competence for driving is outside the range of normal with a higher probability a performing hazardous or extremely dangerous maneuvers  Patient is going cognitively at 54% probability of creating a hazardous situation  Given those findings, patient not to drive  [place] DOT forms sent in     left occipital embolic stroke  Patient clinically stable, has had no new signs or symptoms suggestive of stroke  -- MRI small early infarcts identify left occipital lobe without mass effect  -- echocardiogram with EF 60%  -- CTA posterior cerebral moderate stenosis  -- patient with history of AFib, compliant on Eliquis  -- continue statin therapy  -- BP stable  -- encouraged good blood sugar control, hemoglobin A1c goal less than 7 0  -- should patient have any new signs or symptoms suggestive of CVA, increased focal weakness, difficulty with speech, loss of vision, severe headache, he should call 911 or report to the ER    No problem-specific Assessment & Plan notes found for this encounter  Diagnoses and all orders for this visit:    Stroke (cerebrum) ([address] )    Paroxysmal atrial fibrillation (HCC)    Mild cognitive impairment           Subjective:    HPI    nEoc Tejeda is an 26-year-old gentleman with history of diabetes, CKD stage 3, AFib on Eliquis who presented to the hospital on September 4th 2020with altered mental status  Patient was found driving erratically by the police, he was oriented to self  At that time, speech was noted to be slow, he had a blood sugar of 50  Patient states he had been at the bank, and had not eaten  According to his daughter, she had noticed increased confusion over the prior to weeks, also noted to have staring episodes, dissociation  This was waxing and waning  He was seen by his PCP, told it was old age  Patient lives at home by himself  Patient was seen by Neurology, underwent CTA which showed left posterior cerebral moderate degree stenosis, MRI of the brain showed small early infarcts in the left occipital lobe  Patient has known atrial fib, he was only taking his Eliquis once a day  Lipitor was added to his regimen, patient was asked to increase his Eliquis dosing to twice a day  His diabetic medication was altered  His blood sugars improved  His cognition returned to baseline  He was seen by PT and occupational therapy, initially recommended for rehab but by time of discharge felt stable  to return home        patient last seen in October  He was accompanied today by his daughter  The interim, there been no hospitalizations  Overall he is doing quite well  patient continues on Eliquis twice a day as well as statin, no reported side effects  He denies any headaches, new focal weakness, no new neurologic complaints  Patient continues to live on his own, his daughter helps with his finances, as she does help oversee his medications as well  He remains independent with his ADLs  There were some questions however with some increased forgetfulness  Patient states that he is cooking, no issues leaving the stove on or food burning  His family was some concern as well as clinically of his ability to continue driving  The interim, patient underwent fitness to drive evaluation, scored cognitively a 77% detected probability of failing a specialist on the road test   This indicates a cognitive competence for driving is outside the range of normal with a higher probability of performing hazardous or extremely dangerous maneuvers    Patient is  cognitively at a 54% probability of creating a  hazardous situation  His evaluation was reviewed in detail with both he and his daughter  At this point he is not driving  [place] dot forms have been a submitted  Patient was very distraught, he has a lot of antiques an goes to several shows a year  Currently, these are not occurring, at present he does not have a car  Hopefully, will be able to find someone when the meetings reoccur her to take him  His daughter lives 2 hours away  Fortunately, he is in close walking distance to a pharmacy, [facility]  However, there was concern with transportation in general   Will ask our  to reach out to the family to see what patient's options are  The following portions of the patient's history were reviewed and updated as appropriate: allergies, current medications, past family history, past medical history, past social history, past surgical history and problem list          Objective:  Current Outpatient Medications   Medication Sig Dispense Refill    apixaban (ELIQUIS) 2 5 mg Take 1 tablet (2 5 mg total) by mouth 2 (two) times a day 60 tablet 3    atorvastatin (LIPITOR) 40 mg tablet Take 1 tablet (40 mg total) by mouth every evening 30 tablet 0    BD Pen Needle Karen U/F 32G X 4 MM MISC Use as directed      BD Veo Insulin Syringe U/F 31G X 15/64" 0 3 ML MISC TO USE WITH INSULIN DAILY      metFORMIN (GLUCOPHAGE) 1000 MG tablet Take 1,000 mg by mouth 2 (two) times a day with meals      metoprolol succinate (TOPROL-XL) 50 mg 24 hr tablet Take 1 tablet (50 mg total) by mouth daily 90 tablet 3    MULTIPLE VITAMIN PO Take by mouth      Omega-3 Fatty Acids (FISH OIL OMEGA-3 PO) Take by mouth       No current facility-administered medications for this visit  Blood pressure 130/80, pulse 63, height 5' 6" (1 676 m), weight 51 5 kg (113 lb 9 6 oz)  Physical Exam  Constitutional:       Appearance: Normal appearance     HENT:      Head: Normocephalic  Eyes:      Pupils: Pupils are equal, round, and reactive to light  Cardiovascular:      Rate and Rhythm: Normal rate  Pulses: Normal pulses  Pulmonary:      Effort: Pulmonary effort is normal    Musculoskeletal: Normal range of motion  Skin:     General: Skin is warm  Neurological:      Coordination: Coordination is intact  Deep Tendon Reflexes: Strength normal       Reflex Scores:       Tricep reflexes are 1+ on the right side and 1+ on the left side  Brachioradialis reflexes are 1+ on the right side and 1+ on the left side  Achilles reflexes are 0 on the right side and 0 on the left side  Psychiatric:         Attention and Perception: Attention normal          Speech: Speech normal          Neurological Exam  Mental Status  Awake, alert and oriented to person, place and time  Speech is normal  Able to name objects  Follows two-step commands  Cranial Nerves  CN III, IV, VI: Diminished smooth pursuit  Pupils equal round and reactive to light bilaterally  CN V: Facial sensation is normal   CN VII: Full and symmetric facial movement  CN VIII:  Patient hard hearing  CN XI: Shoulder shrug strength is normal   CN XII: Tongue midline without atrophy or fasciculations  Motor  Decreased muscle bulk throughout  Normal muscle tone  No abnormal involuntary movements  Strength is 5/5 throughout all four extremities  Sensory  Temperature abnormality: Mild distal temperature loss bilateral lower extremities  Vibration abnormality: Mild distal vibratory loss bilateral lower extremities       Reflexes                                           Right                      Left  Brachioradialis                    1+                         1+  Triceps                                1+                         1+  Patellar                                Tr                         Tr  Achilles                                0 0    Coordination  Finger-to-nose, rapid alternating movements and heel-to-shin normal bilaterally without dysmetria  Gait  Casual gait: Wide stance  Reduced stride length  Able to rise from chair without using arms  Ambulated independently  ROS:  Personally reviewed with patient    Review of Systems  Constitutional: Negative  Negative for appetite change and fever  HENT: Negative  Negative for hearing loss, tinnitus, trouble swallowing and voice change  Eyes: Negative  Negative for photophobia and pain  Respiratory: Negative  Negative for shortness of breath  Cardiovascular: Negative  Negative for palpitations  Gastrointestinal: Negative  Negative for nausea and vomiting  Endocrine: Negative  Negative for cold intolerance  Genitourinary: Negative  Negative for dysuria, frequency and urgency  Musculoskeletal: Negative  Negative for myalgias and neck pain  Skin: Negative  Negative for rash  Neurological: Negative  Negative for dizziness, tremors, seizures, syncope, facial asymmetry, speech difficulty, weakness, light-headedness, numbness and headaches  Hematological: Negative  Does not bruise/bleed easily  Psychiatric/Behavioral: Negative    Negative for confusion, hallucinations and sleep disturbance

## 2021-01-14 ENCOUNTER — TELEPHONE (OUTPATIENT)
Dept: NEUROLOGY | Facility: CLINIC | Age: 86
End: 2021-01-14

## 2021-01-14 ENCOUNTER — OFFICE VISIT (OUTPATIENT)
Dept: NEUROLOGY | Facility: CLINIC | Age: 86
End: 2021-01-14
Payer: COMMERCIAL

## 2021-01-14 VITALS
BODY MASS INDEX: 18.26 KG/M2 | DIASTOLIC BLOOD PRESSURE: 80 MMHG | HEIGHT: 66 IN | HEART RATE: 63 BPM | WEIGHT: 113.6 LBS | SYSTOLIC BLOOD PRESSURE: 130 MMHG

## 2021-01-14 DIAGNOSIS — G31.84 MILD COGNITIVE IMPAIRMENT: ICD-10-CM

## 2021-01-14 DIAGNOSIS — I48.0 PAROXYSMAL ATRIAL FIBRILLATION (HCC): ICD-10-CM

## 2021-01-14 DIAGNOSIS — I63.9 STROKE (CEREBRUM) (HCC): Primary | ICD-10-CM

## 2021-01-14 PROCEDURE — 99214 OFFICE O/P EST MOD 30 MIN: CPT | Performed by: NURSE PRACTITIONER

## 2021-01-14 NOTE — PATIENT INSTRUCTIONS
Patient clinically stable  Continues on Eliquis and statin therapy  Blood pressure well controlled  Hemoglobin A1c goal less than 7 0  Patient following with PCP  Discussed patient's fitness to drive evaluation, once again given findings, at this point patient should not be driving

## 2021-01-14 NOTE — PROGRESS NOTES
Patient ID: Omar Harkins is a 80 y o  male  Assessment/Plan:    No problem-specific Assessment & Plan notes found for this encounter  {Assess/PlanSmartLinks:26509}       Subjective:    HPI    {[facility] HPI texts:40383}    {Common ambulatory SmartLinks:23825}         Objective:    Blood pressure 130/80, pulse 63, height 5' 6" (1 676 m), weight 51 5 kg (113 lb 9 6 oz)  Physical Exam    Neurological Exam      ROS:    Review of Systems   Constitutional: Negative  Negative for appetite change and fever  HENT: Negative  Negative for hearing loss, tinnitus, trouble swallowing and voice change  Eyes: Negative  Negative for photophobia and pain  Respiratory: Negative  Negative for shortness of breath  Cardiovascular: Negative  Negative for palpitations  Gastrointestinal: Negative  Negative for nausea and vomiting  Endocrine: Negative  Negative for cold intolerance  Genitourinary: Negative  Negative for dysuria, frequency and urgency  Musculoskeletal: Negative  Negative for myalgias and neck pain  Skin: Negative  Negative for rash  Neurological: Negative  Negative for dizziness, tremors, seizures, syncope, facial asymmetry, speech difficulty, weakness, light-headedness, numbness and headaches  Hematological: Negative  Does not bruise/bleed easily  Psychiatric/Behavioral: Negative  Negative for confusion, hallucinations and sleep disturbance

## 2021-01-14 NOTE — TELEPHONE ENCOUNTER
----- Message from Adam Christensen, [address] sent at 1/14/2021  2:37 PM EST -----  Saw this patient today for hospital follow-up for stroke, cognitive issues  Had recent fitness to drive evaluation did poorly  Forms were sent to [facility] dot, instructed not to drive  He has access locally to I believe pharmacy, drug store  His daughter lives 2 hours away  She was questioning alternative or other transportation possibilities for longer distances Dr  Appointment , ECT    I would reach out to the daughter as opposed to the patient for a further contact   thank you

## 2021-01-15 NOTE — TELEPHONE ENCOUNTER
MSW sent the following email to patient's daughter, Yasmin Jolley, at [email or web address]:    "Regina Ahn for the delay in getting this to you    Here are the transportation resources in the [facility]:    1) Ady Kramer  a  General Information on the service: [employer]/rene/   b  Application: [employer]/wp-content/uploads/2018/09/Application-for-Rene-Paratransit-Services-Senior-Pztbxa-Yprr-Rusvfox-Program-for-People-65  pdf   c  Fare Informaiton: [employer]/DCITSzaida-zen/   2) [facility]  a  General information on the service: [employer]/what-we-do   b  Ways to apply (link to online application or hard copy of application): [employer]/become-member   c  Pricing Information & Instructions: [employer]/assets/images/docs/MembershipApplicationInstructions  pdf   3) [facility] in Action  a  General Information: [employer] uk  org/services  html  b  How to register: [employer]  org/clientform html     Please review the resources and determine if any would be beneficial for your fathers situation  If so, feel fee to apply/register  Please reach out if I can be of further assistance  Best Regards,    Beth Rooney MSW   [facility]  [address] [place] [address], [address] [place] [address]  [phone]   [email or web address]  [email or web address]   www [facility]  org"    MSW will be available to patient/family as needed

## 2021-01-15 NOTE — TELEPHONE ENCOUNTER
MSW phoned patients daughter Lisseth Aponte at [phone] to make her aware of transportation options  MSW discussed [employer] services,  [facility] in [facility]  [employer] - MSW explained that patient would be eligible for a door to door service with [employer] due to his age  MSW advised that patient would need to complete an application and provide a proof of age document and mail it to [facility]  MSW did advise that it could take upwards of three weeks for [place] to process his application  MSW advised that the majority of the cost for [employer] is covered by the [facility] and that patient will only have to pay 15% of the fare via tickets  MSW advised that the cost of the fare depends on how far patient is traveling  MSW will email an application to patients daughter at [email or web address]  [email or web address] [facility] also described services available via [address]  MSW explained that [address] is a private pay that patient can register with to get personal transportation from a  via their person vehicle  MSW did advise that there is a yearly membership fee as well as a cost each time the rider would use the service  MSW will email patients daughter information on this service as well as an application for it  MSW also informed patients daughter of services offered by [address] [place] [address] in [facility]  MSW advised that there are volunteers from several churches that infrequent transportation services for seniors in the [facility]  MSW advised this service is not meant to be a regular means of transportation but rather a service that can help out intermittently  MSW did advise that there is a waitlist for services  MSW will still email patients daughter information on the service in case they are interested in exploring this option  While on the phone patients daughter asked if the [place] offered any free transportation for medical appointments   MSW advise that patients daughter would need to call the [facility] to inquire about this  MSW advised that the only VA transport this writer is aware of is that they offer a bus to the [facility] if the service is not available at the [facility]  MSW offered to follow up with patients daughter regarding transportation options however patients daughter stated that same was not necessary and that she would reach out if she had any questions or concerns  MSW emailed patients daughter the above resources at [email or web address]  MSW did provide this writers contact information and will be available to patient/daughter as needed

## 2021-07-22 ENCOUNTER — OFFICE VISIT (OUTPATIENT)
Dept: NEUROLOGY | Facility: CLINIC | Age: 86
End: 2021-07-22
Payer: COMMERCIAL

## 2021-07-22 VITALS
HEIGHT: 67 IN | SYSTOLIC BLOOD PRESSURE: 120 MMHG | BODY MASS INDEX: 17.11 KG/M2 | WEIGHT: 109 LBS | DIASTOLIC BLOOD PRESSURE: 70 MMHG

## 2021-07-22 DIAGNOSIS — G31.84 MILD COGNITIVE IMPAIRMENT: ICD-10-CM

## 2021-07-22 DIAGNOSIS — I63.9 STROKE (CEREBRUM) (HCC): Primary | ICD-10-CM

## 2021-07-22 DIAGNOSIS — I48.0 PAROXYSMAL ATRIAL FIBRILLATION (HCC): ICD-10-CM

## 2021-07-22 PROCEDURE — 99214 OFFICE O/P EST MOD 30 MIN: CPT | Performed by: NURSE PRACTITIONER

## 2021-07-22 NOTE — PROGRESS NOTES
Patient ID: Jeri Basilio is a 80 y o  male  With CKD stage 3, AFib on Eliquis who presented initially to the hospital with altered mental status after being pulled over by the police due to erratic driving  Workup noted to have embolic infarct    Assessment/Plan:   left occipital embolic stroke  Patient clinically stable, has had no new signs or symptoms suggestive of further stroke  --MRI small early infarcts identified left occipital lobe without mass effect  --echocardiogram with EF of 60%  --CTA posterior cerebral moderate stenosis  --patient with history of AFib, compliant on Eliquis  --continue on statin therapy  --BP stable  --encouraged good blood sugar control, hemoglobin A1c goal less than 7 0  --should patient have any new signs or symptoms suggestive of CVA, increased focal weakness, difficulty with speech, loss of vision, severe headache, he should call 911 or report to the ER    Cognitive deficit  Patient underwent fitness to drive evaluation, scoring cognitively at 77% probability of failing a specialized on the road testing   --patient no longer to drive  --encouraged to remain both cognitively and socially engaged  --discussed medication, once again these are only of temporary benefit, are not neuroprotective  --MOCA today, noted at 17/30 difficulty primarily noted with recall, general orientation       patient presented today with complaints of new onset of loose stool in the past 2 weeks, denies any changes in medications, no changes in his diet  Some concern for potential dehydration  I asked that he contact his PCP  Patient does need updated follow-up with his PCP  If he should have any interim symptoms, he should report to the ER    No problem-specific Assessment & Plan notes found for this encounter         Diagnoses and all orders for this visit:    Stroke (cerebrum) ([address] )    Paroxysmal atrial fibrillation (HCC)    Mild cognitive impairment           Subjective:    HPI   Below HPI maintain in chart for review as previously documented by myself  Leonel Hutton is an 80year-old gentleman with history of diabetes, CKD stage 3, AFib on Eliquis who presented to the hospital on September 4th 2020with altered mental status   Patient was found driving erratically by the police, he was oriented to self   At that time, speech was noted to be slow, he had a blood sugar of 50  Patient states he had been at the bank, and had not eaten   According to his daughter, she had noticed increased confusion over the prior to weeks, also noted to have staring episodes, dissociation   This was waxing and waning  Kirajose Juarez was seen by his PCP, told it was old age  Luis Nunez lives at home by himself  Preciousdereck Nunez was seen by Neurology, underwent CTA which showed left posterior cerebral moderate degree stenosis, MRI of the brain showed small early infarcts in the left occipital lobe   Patient has known atrial fib, he was only taking his Eliquis once a day   Lipitor was added to his regimen, patient was asked to increase his Eliquis dosing to twice a day   His diabetic medication was altered   His blood sugars improved   His cognition returned to baseline  Joe Juarez was seen by PT and occupational therapy, initially recommended for rehab but by time of discharge felt stable  to return home    Patient was last seen in January  Today, he is accompanied by his daughter  The interim, there been no hospitalizations  Overall he continues to do quite well  He denies any new focal weakness  No reports of interim falls, no headaches, no chest pain or shortness of breath  He continues on Eliquis twice a day as well as statin  No reported side effects  Denies any headaches, no new focal weakness  He continues to live on his own, his daughter and granddaughter helps with the finances  She also oversees his medication  Patient had fitness to drive evaluation, scored a 77% probability of failing a specialized on the road testing    Patient no longer driving  Leaf He remains independent with his ADLs  He does have some forgetfulness  His granddaughter typically visits him every day, helps prepare meals  Patient does do crossword puzzles, remains active  His only complaint today is in regards to new onset of loose stool over the past 2 weeks  Denies any changes in medication, no changes in his diet  His daughter was just made aware of this 2 days ago, did give him some Imodium over the past 2 days with benefit  Patient with poor fluid intake in general  Did express to the daughter that should he have ongoing issues, would be concern for potential dehydration  He should call his PCP for follow up appointment  Patient's blood sugars running anywhere from 120-140 at home  The following portions of the patient's history were reviewed and updated as appropriate: allergies, current medications, past family history, past medical history, past social history, past surgical history and problem list          Objective:  Current Outpatient Medications   Medication Sig Dispense Refill    apixaban (ELIQUIS) 2 5 mg Take 1 tablet (2 5 mg total) by mouth 2 (two) times a day 60 tablet 3    atorvastatin (LIPITOR) 40 mg tablet Take 1 tablet (40 mg total) by mouth every evening 30 tablet 0    BD Pen Needle Karen U/F 32G X 4 MM MISC Use as directed      BD Veo Insulin Syringe U/F 31G X 15/64" 0 3 ML MISC TO USE WITH INSULIN DAILY      metFORMIN (GLUCOPHAGE) 1000 MG tablet Take 1,000 mg by mouth 2 (two) times a day with meals      metoprolol succinate (TOPROL-XL) 50 mg 24 hr tablet Take 1 tablet (50 mg total) by mouth daily 90 tablet 3    MULTIPLE VITAMIN PO Take by mouth      Omega-3 Fatty Acids (FISH OIL OMEGA-3 PO) Take by mouth       No current facility-administered medications for this visit  Blood pressure 120/70, height 5' 7" (1 702 m), weight 49 4 kg (109 lb)  Physical Exam  Constitutional:       Appearance: Normal appearance     HENT: Head: Normocephalic  Eyes:      Pupils: Pupils are equal, round, and reactive to light  Cardiovascular:      Rate and Rhythm: Normal rate  Rhythm irregularly irregular  Pulses: Normal pulses  Pulmonary:      Effort: Pulmonary effort is normal    Musculoskeletal:         General: Normal range of motion  Skin:     Findings: Bruising present  Neurological:      Deep Tendon Reflexes:      Reflex Scores:       Tricep reflexes are 1+ on the right side and 1+ on the left side  Brachioradialis reflexes are 1+ on the right side and 1+ on the left side  Achilles reflexes are 0 on the right side and 0 on the left side  Psychiatric:         Mood and Affect: Mood normal          Speech: Speech normal          Behavior: Behavior normal          Thought Content: Thought content normal          Neurological Exam  Mental Status  Awake, alert and oriented to person, place and time  Oriented to person, place and time  Orientation: Patient had difficulty identifying the year and the date  Recalls 0 of 3 objects immediately  At 10 minutes recalls 0 of 3 objects  Able to copy figure  Clock drawing is normal  Visuospatial: Mild difficulty with executive functioning  Speech is normal  Able to name objects and repeat  Follows three-step commands  Language: Patient noted with some decreased language fluency  Able to perform serial calculations  Able to spell words backwards  Digit span was 5 forward and 2 backward  MOCA today noted at 17/30  Cranial Nerves  CN III, IV, VI: Diminished smooth pursuit  Pupils equal round and reactive to light bilaterally  CN V: Facial sensation is normal   CN VII: Full and symmetric facial movement  CN VIII: Patient little hard hearing  CN XI: Shoulder shrug strength is normal   CN XII: Tongue midline without atrophy or fasciculations  Motor  Normal muscle bulk throughout  Normal muscle tone  No abnormal involuntary movements   Strength is 5/5 in all four extremities except as noted  Sensory  Temperature abnormality: Mild distal sensory loss to temperature bilateral lower extremities to above ankles  Vibration abnormality: Decreased vibratory perception distally lower bilateral lower extremity  Reflexes                                           Right                      Left  Brachioradialis                    1+                         1+  Triceps                                1+                         1+  Patellar                                Tr                         Tr  Achilles                                0                         0    Coordination  Right: Finger-to-nose normal   Left: Finger-to-nose normal     Gait  Casual gait: Reduced stride length  Able to rise from chair without using arms  Ambulated independently  ROS:  Personally reviewed with patient    Review of Systems  Constitutional: Negative  HENT: Positive for hearing loss  Eyes:        Wears reading glasses     Respiratory: Negative  Cardiovascular: Negative  Gastrointestinal: Positive for diarrhea (2 Weeks )  Endocrine: Negative  Genitourinary: Negative  Musculoskeletal: Negative  Skin: Negative  Allergic/Immunologic: Negative  Neurological: Negative  Hematological: Negative  Psychiatric/Behavioral: Negative  no

## 2021-07-22 NOTE — PATIENT INSTRUCTIONS
Pt clinically stable   continue on Eliquis   continue statin   BP well controlled    Good blood sugar control   needs to f/u with PCP

## 2021-12-01 ENCOUNTER — TELEPHONE (OUTPATIENT)
Dept: NEUROLOGY | Facility: CLINIC | Age: 86
End: 2021-12-01

## 2021-12-10 ENCOUNTER — TELEPHONE (OUTPATIENT)
Dept: NEUROLOGY | Facility: CLINIC | Age: 86
End: 2021-12-10

## 2021-12-10 NOTE — TELEPHONE ENCOUNTER
Received call from [facility] with [address]  She reports pt is scheduled for MRI brain wo contrast on 12/14/21  PA is needed  P: [phone]  F: [phone]    They faxed request, this is in prior auth imaging fax folder  [id number] NPI: [id number]  Address is [address] [place] [address], [address] [place] [address],[address], Shannen CAGLE, [address] [place] [address]

## 2021-12-16 ENCOUNTER — TELEPHONE (OUTPATIENT)
Dept: NEUROLOGY | Facility: CLINIC | Age: 86
End: 2021-12-16

## 2022-01-11 ENCOUNTER — TELEPHONE (OUTPATIENT)
Dept: NEUROLOGY | Facility: CLINIC | Age: 87
End: 2022-01-11

## 2022-01-11 NOTE — TELEPHONE ENCOUNTER
Called and spoke to patient's daughter she confirmed 01/18/22 appt  Declined virtual prefers in person

## 2022-01-16 NOTE — PROGRESS NOTES
Patient ID: Chichi Kim is a 80 y o  male  Assessment/Plan:  Left occipital embolic stroke  --patient with recent updated MRI evidence of old left occipital infarct noted, no new ischemic event  Microangiopathic changes noted  --echocardiogram with EF of 60%  --CTA posterior cerebral moderate stenosis  --patient with history of AFib, compliant on Eliquis  --continue on statin therapy  --BP stable  --will obtain updated labs  --should patient have any new signs or symptoms suggestive of CVA, increased focal weakness, difficulty with speech, loss of vision, he should call 911 or report back to the ER    Cognitive deficit  patient recently reported to have episode of decreased mentation, decreased interaction, no evidence of infectious process at that time, MRI unchanged  Patient had just received his COVID booster, unclear if associated  Fortunately, he is for the most part back to his baseline  --patient does not drive, obtained a 16% probability with FTD evaluation of failing specialized on the road testing  --encouraged patient to remain both cognitively and socially engaged  --patient to follow-up with primary care  --encouraged adequate fluid and nutritional intake    No problem-specific Assessment & Plan notes found for this encounter  Diagnoses and all orders for this visit:    Mild cognitive impairment  -     CBC and differential; Future  -     Comprehensive metabolic panel; Future  -     Lipid panel; Future  -     HEMOGLOBIN A1C W/ EAG ESTIMATION; Future  -     TSH, 3rd generation with Free T4 reflex; Future    Stroke (cerebrum) (HCC)  -     CBC and differential; Future  -     Comprehensive metabolic panel; Future  -     Lipid panel; Future  -     HEMOGLOBIN A1C W/ EAG ESTIMATION; Future  -     TSH, 3rd generation with Free T4 reflex;  Future    Type 2 diabetes mellitus (HCC)           Subjective:    HPI   Below HPI maintain in chart as previously documented by myself for review  Leonel Hutton is an 80year-old gentleman with history of diabetes, CKD stage 3, AFib on Eliquis who presented to the hospital on September 4th 2020with altered mental status   Patient was found driving erratically by the police, he was oriented to self   At that time, speech was noted to be slow, he had a blood sugar of 50  Patient states he had been at the bank, and had not eaten   According to his daughter, she had noticed increased confusion over the prior to weeks, also noted to have staring episodes, dissociation   This was waxing and waning  [facility] was seen by his PCP, told it was old age  Ross Yin lives at home by himself  Ross Yin was seen by Neurology, underwent CTA which showed left posterior cerebral moderate degree stenosis, MRI of the brain showed small early infarcts in the left occipital lobe   Patient has known atrial fib, he was only taking his Eliquis once a day   Lipitor was added to his regimen, patient was asked to increase his Eliquis dosing to twice a day   His diabetic medication was altered   His blood sugars improved   His cognition returned to baseline  [facility] was seen by PT and occupational therapy, initially recommended for rehab but by time of discharge felt stable  to return home    Patient accompanied today by his daughter  Patient last seen in July  At that time, patient clinically stable  He had undergone a fitness to drive evaluation, had 77% probability of failing specialized on the road testing, patient was no longer driving  In The interim, patient's daughter contacted the office stating in the past few weeks, patient had become less interactive/engaging, not speaking much, only saying yes and no, sleeping excessively  Ambulation unchanged  Patient was asked to follow-up with PCP to rule out infection  Suggested he undergo MRI scan look for any new intracranial pathology  Patient was seen at [facility] ER, lab work unremarkable  They did not however test his urine    He had follow-up MRI completed, no evidence of any new infarct, chronic infarct noted left occipital   In speaking with his daughter, this seems to have followed his COVID booster injection  Fortunately, patient seems to have improved, eating more, more interactive  Today, daughter states he is mostly at his baseline, does continue to be somewhat less interactive, doing his word searches less often  Remains independent with his ADLs  Does once again have issues with forgetfulness  His granddaughter typically visits him every day, help prepare meals  He denies any headaches, no interim falls, no chest pain or shortness of breath  Patient with prior history of stroke, continues on Eliquis and statin therapy  No new focal weakness, no new signs or symptoms suggestive of further stroke  He is diabetic, blood sugars fairly well controlled  Follows with his PCP      Below reviewed with patient  MRI brain 12/14/2021 ([facility]):  Ventricular caliber is mildly increased at a proportion to diffuse sulcal prominence 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, of uncertain etiology  No recent infarct, midline shift, or intracranial mass  Mild chronic microvascular ischemic changes    Old infarcts left occipital lobe    The following portions of the patient's history were reviewed and updated as appropriate: allergies, current medications, past family history, past medical history, past social history, past surgical history and problem list          Objective:  Current Outpatient Medications   Medication Sig Dispense Refill    apixaban (ELIQUIS) 2 5 mg Take 1 tablet (2 5 mg total) by mouth 2 (two) times a day 60 tablet 3    atorvastatin (LIPITOR) 40 mg tablet Take 1 tablet (40 mg total) by mouth every evening 30 tablet 0    BD Pen Needle Karen U/F 32G X 4 MM MISC Use as directed      BD Veo Insulin Syringe U/F 31G X 15/64" 0 3 ML MISC TO USE WITH INSULIN DAILY      metFORMIN (GLUCOPHAGE) 1000 MG tablet Take 1,000 mg by mouth 2 (two) times a day with meals      metoprolol succinate (TOPROL-XL) 50 mg 24 hr tablet Take 1 tablet (50 mg total) by mouth daily 90 tablet 3    MULTIPLE VITAMIN PO Take by mouth      Omega-3 Fatty Acids (FISH OIL OMEGA-3 PO) Take by mouth       No current facility-administered medications for this visit  Blood pressure 145/67, pulse 70, temperature (!) 97 °F (36 1 °C), temperature source Tympanic, resp  rate 18, height 5' 6" (1 676 m), weight 49 4 kg (109 lb)  Physical Exam  Constitutional:       General: He is awake  Appearance: Normal appearance  HENT:      Head: Normocephalic  Cardiovascular:      Rate and Rhythm: Rhythm irregular  Pulmonary:      Effort: Pulmonary effort is normal    Musculoskeletal:      Cervical back: Decreased range of motion  Neurological:      Mental Status: He is alert  Deep Tendon Reflexes:      Reflex Scores:       Tricep reflexes are 1+ on the right side and 1+ on the left side  Brachioradialis reflexes are 1+ on the right side and 1+ on the left side  Patellar reflexes are 1+ on the right side and 1+ on the left side  Psychiatric:         Speech: Speech normal          Behavior: Behavior normal          Neurological Exam  Mental Status  Awake and alert  Orientation: Patient oriented to month, unable to correctly identify the year (2021) or day of the week    Recalls 0 of 3 objects immediately  At 5 minutes Clock drawing is normal  Speech is normal  Speech: Speech hypophonic, slow  Able to name objects  Follows three-step commands   Visuospatial: Mild difficulty with executive functioning  Able to spell words backwards  Digit span was 5 forward and 2 backward       Cranial Nerves  CN III, IV, VI: Diminished smooth pursuit  CN V: Facial sensation is normal   CN VII: Full and symmetric facial movement    CN XI: Shoulder shrug strength is normal   CN XII: Tongue midline without atrophy or fasciculations  Motor  Normal muscle bulk throughout  Normal muscle tone  No abnormal involuntary movements  Sensory  Temperature abnormality: Vibration abnormality:     Reflexes                                           Right                      Left  Brachioradialis                    1+                         1+  Triceps                                1+                         1+  Patellar                                1+                         1+    Coordination  Right: Finger-to-nose normal   Left: Finger-to-nose normal     Gait  Casual gait: Reduced stride length  Able to rise from chair without using arms  Ambulates independently  ROS:  Personally reviewed with patient    Review of Systems   Constitutional: Positive for activity change  Neurological:        Short-term memory loss   Psychiatric/Behavioral: Positive for confusion  Negative for behavioral problems  Constitutional: Negative for chills and fever  HENT: Negative for ear pain and sore throat  Eyes: Negative for pain and visual disturbance  Respiratory: Negative for cough and shortness of breath  Cardiovascular: Negative for chest pain and palpitations  Gastrointestinal: Negative for abdominal pain and vomiting  Genitourinary: Negative for dysuria and hematuria  Musculoskeletal: Negative for arthralgias and back pain  Skin: Negative for color change and rash  Neurological: Negative for seizures and syncope     All other systems reviewed and are negative

## 2022-01-18 ENCOUNTER — OFFICE VISIT (OUTPATIENT)
Dept: NEUROLOGY | Facility: CLINIC | Age: 87
End: 2022-01-18
Payer: COMMERCIAL

## 2022-01-18 VITALS
HEART RATE: 70 BPM | RESPIRATION RATE: 18 BRPM | BODY MASS INDEX: 17.52 KG/M2 | WEIGHT: 109 LBS | DIASTOLIC BLOOD PRESSURE: 67 MMHG | SYSTOLIC BLOOD PRESSURE: 145 MMHG | TEMPERATURE: 97 F | HEIGHT: 66 IN

## 2022-01-18 DIAGNOSIS — G31.84 MILD COGNITIVE IMPAIRMENT: Primary | ICD-10-CM

## 2022-01-18 DIAGNOSIS — I63.9 STROKE (CEREBRUM) (HCC): ICD-10-CM

## 2022-01-18 DIAGNOSIS — E11.9 TYPE 2 DIABETES MELLITUS (HCC): ICD-10-CM

## 2022-01-18 PROCEDURE — 99214 OFFICE O/P EST MOD 30 MIN: CPT | Performed by: NURSE PRACTITIONER

## 2022-01-18 NOTE — PROGRESS NOTES
Review of Systems   Constitutional: Negative for chills and fever  HENT: Negative for ear pain and sore throat  Eyes: Negative for pain and visual disturbance  Respiratory: Negative for cough and shortness of breath  Cardiovascular: Negative for chest pain and palpitations  Gastrointestinal: Negative for abdominal pain and vomiting  Genitourinary: Negative for dysuria and hematuria  Musculoskeletal: Negative for arthralgias and back pain  Skin: Negative for color change and rash  Neurological: Negative for seizures and syncope  All other systems reviewed and are negative

## 2022-01-18 NOTE — PATIENT INSTRUCTIONS
Patient clinically stable  Continues on Eliquis and statin therapy  Blood pressure stable  Encouraged good blood sugar control  Will obtain updated labs  Encouraged patient to remain both cognitively and socially active  Recent MRI without evidence of any new ischemic pathology  Patient or family to call given any new neurologic symptoms

## 2022-02-03 ENCOUNTER — TELEPHONE (OUTPATIENT)
Dept: NEUROLOGY | Facility: CLINIC | Age: 87
End: 2022-02-03

## 2022-02-03 LAB
ALBUMIN SERPL-MCNC: 4.5 G/DL (ref 3.6–5.1)
ALBUMIN/GLOB SERPL: 1.8 (CALC) (ref 1–2.5)
ALP SERPL-CCNC: 91 U/L (ref 35–144)
ALT SERPL-CCNC: 22 U/L (ref 9–46)
AST SERPL-CCNC: 19 U/L (ref 10–35)
BASOPHILS # BLD AUTO: 49 CELLS/UL (ref 0–200)
BASOPHILS NFR BLD AUTO: 0.7 %
BILIRUB SERPL-MCNC: 0.5 MG/DL (ref 0.2–1.2)
BUN SERPL-MCNC: 33 MG/DL (ref 7–25)
BUN/CREAT SERPL: 31 (CALC) (ref 6–22)
CALCIUM SERPL-MCNC: 9.4 MG/DL (ref 8.6–10.3)
CHLORIDE SERPL-SCNC: 101 MMOL/L (ref 98–110)
CHOLEST SERPL-MCNC: 106 MG/DL
CHOLEST/HDLC SERPL: 3.3 (CALC)
CO2 SERPL-SCNC: 30 MMOL/L (ref 20–32)
CREAT SERPL-MCNC: 1.06 MG/DL (ref 0.7–1.11)
EOSINOPHIL # BLD AUTO: 350 CELLS/UL (ref 15–500)
EOSINOPHIL NFR BLD AUTO: 5 %
ERYTHROCYTE [DISTWIDTH] IN BLOOD BY AUTOMATED COUNT: 12.1 % (ref 11–15)
EST. AVERAGE GLUCOSE BLD GHB EST-MCNC: 226 MG/DL
EST. AVERAGE GLUCOSE BLD GHB EST-SCNC: 12.5 MMOL/L
GLOBULIN SER CALC-MCNC: 2.5 G/DL (CALC) (ref 1.9–3.7)
GLUCOSE SERPL-MCNC: 150 MG/DL (ref 65–99)
HBA1C MFR BLD: 9.5 % OF TOTAL HGB
HCT VFR BLD AUTO: 36.9 % (ref 38.5–50)
HDLC SERPL-MCNC: 32 MG/DL
HGB BLD-MCNC: 12.1 G/DL (ref 13.2–17.1)
LDLC SERPL CALC-MCNC: 54 MG/DL (CALC)
LYMPHOCYTES # BLD AUTO: 2702 CELLS/UL (ref 850–3900)
LYMPHOCYTES NFR BLD AUTO: 38.6 %
MCH RBC QN AUTO: 29.9 PG (ref 27–33)
MCHC RBC AUTO-ENTMCNC: 32.8 G/DL (ref 32–36)
MCV RBC AUTO: 91.1 FL (ref 80–100)
MONOCYTES # BLD AUTO: 427 CELLS/UL (ref 200–950)
MONOCYTES NFR BLD AUTO: 6.1 %
NEUTROPHILS # BLD AUTO: 3472 CELLS/UL (ref 1500–7800)
NEUTROPHILS NFR BLD AUTO: 49.6 %
NONHDLC SERPL-MCNC: 74 MG/DL (CALC)
PLATELET # BLD AUTO: 198 THOUSAND/UL (ref 140–400)
PMV BLD REES-ECKER: 11.2 FL (ref 7.5–12.5)
POTASSIUM SERPL-SCNC: 4.5 MMOL/L (ref 3.5–5.3)
PROT SERPL-MCNC: 7 G/DL (ref 6.1–8.1)
RBC # BLD AUTO: 4.05 MILLION/UL (ref 4.2–5.8)
REF LAB TEST NAME: NORMAL
REF LAB TEST: NORMAL
SL AMB CLIENT CONTACT: NORMAL
SL AMB EGFR AFRICAN AMERICAN: 73 ML/MIN/1.73M2
SL AMB EGFR NON AFRICAN AMERICAN: 63 ML/MIN/1.73M2
SODIUM SERPL-SCNC: 139 MMOL/L (ref 135–146)
TRIGL SERPL-MCNC: 110 MG/DL
TSH SERPL-ACNC: 4.61 MIU/L (ref 0.4–4.5)
WBC # BLD AUTO: 7 THOUSAND/UL (ref 3.8–10.8)

## 2022-02-03 NOTE — TELEPHONE ENCOUNTER
----- Message from Jose Luis Springer PA-C sent at 2/2/2022 11:56 AM EST -----  Reviewing labs in Carmelita's absence, recently seen by her on 1/18/22  History of CVA  His A1C is up, now at 9 5  TSH slightly elevated 4 61  He is slightly anemic with Hb 12 1  Lipid panel was ordered, but I do not see it was drawn  Can we find out why it was not drawn? He needs to f/u with PCP for these abnormalities on his labs  Needs better control of DM, PCP to monitor thyroid and slight anemia

## 2022-02-03 NOTE — TELEPHONE ENCOUNTER
Called patient  Spoke w/daughter Lalito Vega (on consent form)  Advised her of results and recommendations  Daughter verbalized understanding  She will follow-up w/PCP  She requested we send labs to Dr Darell León  Lab results faxed to PCP @ [phone]  [employer] [phone]  Spoke w/Kasi regarding Lipid panel that was not drawn  He believes they can add on to existing specimen from 2/1/22  If it is not possible, he will call back later today  If it is possible, we can expect results in 24 hours  Will follow up on lab      Miah hawk

## 2022-02-04 NOTE — TELEPHONE ENCOUNTER
Lipid results were received  Also received fax from [facility] requiring provider signature for manual addition of lipid panel to existing specimen  Form signed and faxed to [facility] at [phone]  Called patient  Spoke w/patient's BARBARA (on consent form)  Advised him [facility] was able to process lipid panel using previous specimen  Called Dr Irina Preston office  Spoke w/Radha  She states they did not receive lab results  Confirmed fax #[phone]  Results faxed again to PCP at above number

## 2022-11-04 ENCOUNTER — APPOINTMENT (EMERGENCY)
Dept: CT IMAGING | Facility: HOSPITAL | Age: 87
End: 2022-11-04

## 2022-11-04 ENCOUNTER — HOSPITAL ENCOUNTER (EMERGENCY)
Facility: HOSPITAL | Age: 87
Discharge: HOME | End: 2022-11-05
Attending: EMERGENCY MEDICINE

## 2022-11-04 VITALS
RESPIRATION RATE: 15 BRPM | OXYGEN SATURATION: 94 % | HEART RATE: 66 BPM | TEMPERATURE: 98.2 F | DIASTOLIC BLOOD PRESSURE: 62 MMHG | SYSTOLIC BLOOD PRESSURE: 114 MMHG

## 2022-11-04 DIAGNOSIS — S00.81XA ABRASION OF FACE, INITIAL ENCOUNTER: ICD-10-CM

## 2022-11-04 DIAGNOSIS — E11.9 DM (DIABETES MELLITUS) (HCC): ICD-10-CM
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
## 2022-11-04 DIAGNOSIS — D64.9 ANEMIA: ICD-10-CM

## 2022-11-04 DIAGNOSIS — D69.6 THROMBOCYTOPENIA (HCC): ICD-10-CM

## 2022-11-04 LAB
ANION GAP SERPL CALCULATED.3IONS-SCNC: 6 MMOL/L (ref 4–13)
APTT PPP: 28 SECONDS (ref 23–37)
BASOPHILS # BLD AUTO: 0.03 THOUSANDS/ÂΜL (ref 0–0.1)
BASOPHILS NFR BLD AUTO: 1 % (ref 0–1)
BUN SERPL-MCNC: 33 MG/DL (ref 5–25)
CALCIUM SERPL-MCNC: 8.1 MG/DL (ref 8.3–10.1)
CHLORIDE SERPL-SCNC: 104 MMOL/L (ref 96–108)
CO2 SERPL-SCNC: 32 MMOL/L (ref 21–32)
CREAT SERPL-MCNC: 1.16 MG/DL (ref 0.6–1.3)
EOSINOPHIL # BLD AUTO: 0.27 THOUSAND/ÂΜL (ref 0–0.61)
EOSINOPHIL NFR BLD AUTO: 4 % (ref 0–6)
ERYTHROCYTE [DISTWIDTH] IN BLOOD BY AUTOMATED COUNT: 12.3 % (ref 11.6–15.1)
GFR SERPL CREATININE-BSD FRML MDRD: 55 ML/MIN/1.73SQ M
GLUCOSE SERPL-MCNC: 222 MG/DL (ref 65–140)
HCT VFR BLD AUTO: 31.1 % (ref 36.5–49.3)
HGB BLD-MCNC: 10.2 G/DL (ref 12–17)
IMM GRANULOCYTES # BLD AUTO: 0.02 THOUSAND/UL (ref 0–0.2)
IMM GRANULOCYTES NFR BLD AUTO: 0 % (ref 0–2)
INR PPP: 1.16 (ref 0.84–1.19)
LYMPHOCYTES # BLD AUTO: 1.92 THOUSANDS/ÂΜL (ref 0.6–4.47)
LYMPHOCYTES NFR BLD AUTO: 29 % (ref 14–44)
MCH RBC QN AUTO: 31.1 PG (ref 26.8–34.3)
MCHC RBC AUTO-ENTMCNC: 32.8 G/DL (ref 31.4–37.4)
MCV RBC AUTO: 95 FL (ref 82–98)
MONOCYTES # BLD AUTO: 0.5 THOUSAND/ÂΜL (ref 0.17–1.22)
MONOCYTES NFR BLD AUTO: 8 % (ref 4–12)
NEUTROPHILS # BLD AUTO: 3.87 THOUSANDS/ÂΜL (ref 1.85–7.62)
NEUTS SEG NFR BLD AUTO: 58 % (ref 43–75)
NRBC BLD AUTO-RTO: 0 /100 WBCS
PLATELET # BLD AUTO: 141 THOUSANDS/UL (ref 149–390)
PMV BLD AUTO: 10.1 FL (ref 8.9–12.7)
POTASSIUM SERPL-SCNC: 4.4 MMOL/L (ref 3.5–5.3)
PROTHROMBIN TIME: 14.8 SECONDS (ref 11.6–14.5)
RBC # BLD AUTO: 3.28 MILLION/UL (ref 3.88–5.62)
SODIUM SERPL-SCNC: 142 MMOL/L (ref 135–147)
WBC # BLD AUTO: 6.61 THOUSAND/UL (ref 4.31–10.16)

## 2022-11-04 RX ORDER — GINSENG 100 MG
1 CAPSULE ORAL ONCE
Status: COMPLETED | OUTPATIENT
Start: 2022-11-04 | End: 2022-11-04

## 2022-11-04 RX ORDER — ACETAMINOPHEN 325 MG/1
650 TABLET ORAL ONCE
Status: COMPLETED | OUTPATIENT
Start: 2022-11-04 | End: 2022-11-04

## 2022-11-04 RX ADMIN — BACITRACIN 1 SMALL APPLICATION: 500 OINTMENT TOPICAL at 22:59

## 2022-11-04 RX ADMIN — ACETAMINOPHEN 650 MG: 325 TABLET ORAL at 22:01

## 2022-11-04 RX ADMIN — TETANUS TOXOID, REDUCED DIPHTHERIA TOXOID AND ACELLULAR PERTUSSIS VACCINE, ADSORBED 0.5 ML: 5; 2.5; 8; 8; 2.5 SUSPENSION INTRAMUSCULAR at 22:25

## 2022-11-05 NOTE — ED PROVIDER NOTES
History  Chief Complaint   Patient presents with   • Fall     Pt fell tripped over landed on left side  Ems was called fam decided to bring him over  Pt on eliquis no loc abrasion to left side of head  No c/o      Pt is an 88-y o  M with a PMHx significant for a fib on eliquis, CKD, DM on insulin and HTN presenting to the ED with his daughter with concern for fall with headstrike shortly PTA  Daughter reports they were walking through a parking lot when the pt stepped into a depression in the asphalt throwing him off balance, states he took a few steps forward to catch himself but fell forward  Was able to brace his fall with his hands but did hit the L side of his face and around the L eye  Daughter denies LOC and there is no amnesia to the event  States he was able to get up and ambulate after the incident  EMS was called by a bystander, but pt and family declined transport to the hospital  They present today by recommendation by EMS as pt is on Eliquis which he has been taking as prescribed  States prior to the fall he has otherwise been well without any other complaints  No symptoms prior to falling and reports it was strictly a mechanical fall  States he has some discomfort around the L eye but is not significant, no pain medications PTA  Denies any other pain/injuries  Specifically denies fever, chills, cough, chest pain, shortness of breath, abdominal pain, nausea, vomiting, diarrhea, urinary complaints, neck pain, back pain, extremity pain, headache, confusion, speech changes, vision changes, pain with eye movement, balance issues, gait disturbances, focal deficits and any other complaint  Daughter reports pt is at his baseline  Unsure of last tetanus  Prior to Admission Medications   Prescriptions Last Dose Informant Patient Reported? Taking?    BD Pen Needle Karen U/F 32G X 4 MM MISC  Self Yes No   Sig: Use as directed   BD Veo Insulin Syringe U/F 31G X 15/64" 0 3 ML MISC  Self Yes No   Sig: TO USE WITH INSULIN DAILY   MULTIPLE VITAMIN PO  Self Yes No   Sig: Take by mouth   Omega-3 Fatty Acids (FISH OIL OMEGA-3 PO)  Self Yes No   Sig: Take by mouth   apixaban (ELIQUIS) 2 5 mg  Self No No   Sig: Take 1 tablet (2 5 mg total) by mouth 2 (two) times a day   atorvastatin (LIPITOR) 40 mg tablet  Self No No   Sig: Take 1 tablet (40 mg total) by mouth every evening   metFORMIN (GLUCOPHAGE) 1000 MG tablet  Self Yes No   Sig: Take 1,000 mg by mouth 2 (two) times a day with meals   metoprolol succinate (TOPROL-XL) 50 mg 24 hr tablet  Self No No   Sig: Take 1 tablet (50 mg total) by mouth daily      Facility-Administered Medications: None       Past Medical History:   Diagnosis Date   • A-fib ([facility])    • CKD (chronic kidney disease) stage 3, GFR 30-59 ml/min ([facility])    • Diabetes mellitus ([facility] [address] )        History reviewed  No pertinent surgical history  Family History   Problem Relation Age of Onset   • Breast cancer Mother      I have reviewed and agree with the history as documented  E-Cigarette/Vaping   • E-Cigarette Use Never User      E-Cigarette/Vaping Substances   • Nicotine No    • THC No    • CBD No    • Flavoring No      Social History     Tobacco Use   • Smoking status: Never Smoker   • Smokeless tobacco: Never Used   Vaping Use   • Vaping Use: Never used   Substance Use Topics   • Alcohol use: Never   • Drug use: Never       Review of Systems   Constitutional: Negative for chills and fever  HENT: Negative for congestion, ear pain, rhinorrhea and sore throat  (+) L periorbital abrasion, swelling and pain   Eyes: Negative for pain and visual disturbance  Respiratory: Negative for cough and shortness of breath  Cardiovascular: Negative for chest pain and palpitations  Gastrointestinal: Negative for abdominal pain, diarrhea, nausea and vomiting  Genitourinary: Negative for dysuria and hematuria  Musculoskeletal: Negative for arthralgias, back pain and neck pain     Skin: Negative for color change and rash  Neurological: Negative for dizziness, seizures, syncope, speech difficulty, weakness and headaches  Psychiatric/Behavioral: Negative for confusion  All other systems reviewed and are negative  Physical Exam  Physical Exam  Vitals and nursing note reviewed  Constitutional:       General: He is awake  He is not in acute distress  Appearance: He is well-developed  He is not ill-appearing or toxic-appearing  HENT:      Head: Normocephalic and atraumatic  Jaw: There is normal jaw occlusion  Right Ear: Tympanic membrane and ear canal normal       Left Ear: Tympanic membrane and ear canal normal       Nose: Nose normal       Mouth/Throat:      Mouth: Mucous membranes are moist       Pharynx: Oropharynx is clear  Comments: Oropharynx patent and clear  Eyes:      General: Vision grossly intact  Extraocular Movements: Extraocular movements intact  Conjunctiva/sclera: Conjunctivae normal       Pupils: Pupils are equal, round, and reactive to light  Cardiovascular:      Rate and Rhythm: Normal rate and regular rhythm  Heart sounds: No murmur heard  Pulmonary:      Effort: Pulmonary effort is normal  No respiratory distress  Breath sounds: Normal breath sounds  Comments: Clear breath sounds auscultated throughout all lung fields b/l  No respiratory distress  Chest:      Chest wall: No lacerations, deformity, swelling or tenderness  Abdominal:      Palpations: Abdomen is soft  Tenderness: There is no abdominal tenderness  Comments: Soft, non-distended and non-tender  No overlying skin changes  Musculoskeletal:      Cervical back: Neck supple  Right lower leg: No edema  Left lower leg: No edema  Comments: No TTP of b/l UE or b/l LE  No deformity  Full, painless ROM intact b/l  No midline or paraspinal TTP, step-offs or deformity of C/T/L spine  No overlying skin changes  No thoracic TTP   Small, superficial abrasion over the lateral aspect of the L periorbital area  No bruising or bony instability  No other facial pain or periorbital TTP  Occlusion normal   No racoon eyes, gatica sign or CSF leak  Pelvis is stable without pain  1 mm area of skin disruption over pad of L 3rd distal phalanx  Lymphadenopathy:      Cervical: No cervical adenopathy  Skin:     General: Skin is warm and dry  Capillary Refill: Capillary refill takes less than 2 seconds  Neurological:      General: No focal deficit present  Mental Status: He is alert  GCS: GCS eye subscore is 4  GCS verbal subscore is 5  GCS motor subscore is 6  Cranial Nerves: Cranial nerves are intact  Sensory: Sensation is intact  Motor: Motor function is intact  Coordination: Coordination is intact  Gait: Gait is intact  Deep Tendon Reflexes: Reflexes normal       Comments: Oriented to person, place and incident  Psychiatric:         Behavior: Behavior is cooperative           Vital Signs  ED Triage Vitals [11/04/22 2004]   Temperature Pulse Respirations Blood Pressure SpO2   98 2 °F (36 8 °C) 75 14 128/60 98 %      Temp Source Heart Rate Source Patient Position - Orthostatic VS BP Location FiO2 (%)   Oral Monitor Sitting Right arm --      Pain Score       No Pain           Vitals:    11/04/22 2004 11/04/22 2259   BP: 128/60 114/62   Pulse: 75 66   Patient Position - Orthostatic VS: Sitting          Visual Acuity      ED Medications  Medications   acetaminophen (TYLENOL) tablet 650 mg (650 mg Oral Given 11/4/22 2201)   tetanus-diphtheria-acellular pertussis (BOOSTRIX) IM injection 0 5 mL (0 5 mL Intramuscular Given 11/4/22 2225)   bacitracin topical ointment 1 small application (1 small application Topical Given 11/4/22 2259)       Diagnostic Studies  Results Reviewed     Procedure Component Value Units Date/Time    Protime-INR [[id number]]  (Abnormal) Collected: 11/04/22 2219    Lab Status: Final result Specimen: Blood from Arm, Left Updated: 11/04/22 2240     Protime 14 8 seconds      INR 1 16    APTT [[id number]]  (Normal) Collected: 11/04/22 2219    Lab Status: Final result Specimen: Blood from Arm, Left Updated: 11/04/22 2240     PTT 28 seconds     Basic metabolic panel [[id number]]  (Abnormal) Collected: 11/04/22 2219    Lab Status: Final result Specimen: Blood from Arm, Left Updated: 11/04/22 2240     Sodium 142 mmol/L      Potassium 4 4 mmol/L      Chloride 104 mmol/L      CO2 32 mmol/L      ANION GAP 6 mmol/L      BUN 33 mg/dL      Creatinine 1 16 mg/dL      Glucose 222 mg/dL      Calcium 8 1 mg/dL      eGFR 55 ml/min/1 73sq m     Narrative:      [place] guidelines for Chronic Kidney Disease (CKD):   •  Stage 1 with normal or high GFR (GFR > 90 mL/min/1 73 square meters)  •  Stage 2 Mild CKD (GFR = 60-89 mL/min/1 73 square meters)  •  Stage 3A Moderate CKD (GFR = 45-59 mL/min/1 73 square meters)  •  Stage 3B Moderate CKD (GFR = 30-44 mL/min/1 73 square meters)  •  Stage 4 Severe CKD (GFR = 15-29 mL/min/1 73 square meters)  •  Stage 5 End Stage CKD (GFR <15 mL/min/1 73 square meters)  Note: GFR calculation is accurate only with a steady state creatinine    CBC and differential [[id number]]  (Abnormal) Collected: 11/04/22 2219    Lab Status: Final result Specimen: Blood from Arm, Left Updated: 11/04/22 2226     WBC 6 61 Thousand/uL      RBC 3 28 Million/uL      Hemoglobin 10 2 g/dL      Hematocrit 31 1 %      MCV 95 fL      MCH 31 1 pg      MCHC 32 8 g/dL      RDW 12 3 %      MPV 10 1 fL      Platelets 517 Thousands/uL      nRBC 0 /100 WBCs      Neutrophils Relative 58 %      Immat GRANS % 0 %      Lymphocytes Relative 29 %      Monocytes Relative 8 %      Eosinophils Relative 4 %      Basophils Relative 1 %      Neutrophils Absolute 3 87 Thousands/µL      Immature Grans Absolute 0 02 Thousand/uL      Lymphocytes Absolute 1 92 Thousands/µL      Monocytes Absolute 0 50 Thousand/µL      Eosinophils Absolute 0 27 Thousand/µL      Basophils Absolute 0 03 Thousands/µL                  CT head without contrast   Final Result by Vijay Horne MD (11/04 2304)      No acute intracranial abnormality  Workstation performed: [id number]         CT facial bones without contrast   Final Result by Vijay Horne MD (11/04 2300)      1  No evidence of acute traumatic injury to the facial bones  2   Sinus disease  3   Periodontal disease  Workstation performed: [id number]         CT spine cervical without contrast   Final Result by Vijay Horne MD (11/04 2253)      No cervical spine fracture or traumatic malalignment  Workstation performed: [id number]                    Procedures  Procedures         ED Course  ED Course as of 11/05/22 0614   Fri Nov 04, 2022   2256 Hemoglobin(!): 10 2  Slight decrease from CBC 2 years ago     2257 Glucose, Random(!): 222  Around baseline per previous labs  No AG  Pt with hx of DM on insulin  2300 Platelet Count(!): 469  Similar to CBC 2 years ago  2301   CT C-spine IMPRESSION:     No cervical spine fracture or traumatic malalignment  2302 CT facial bones IMPRESSION:     1  No evidence of acute traumatic injury to the facial bones      2  Sinus disease      3   Periodontal disease  2309 CT head IMPRESSION:     No acute intracranial abnormality  2322 On bedside re-evaluation patient continues to rest in the stretcher in no acute distress  Verbally communicated all findings with the patient and his daughter  Pt has no complaints or findings suggesting bacterial sinusitis, Advised to follow-up with the patient's PCP in 2 days for re-evaluation and further management  Advised to continue acetaminophen as needed for pain control  Patient able ambulate while in the ED without issue    Strict return precautions verbally communicated to the patient as outlined in the AVS   All patient questions and concerns were answered  Patient verbally communicated their understanding and agreement to the above plan  Patient stable at discharge  MDM  Number of Diagnoses or Management Options     Amount and/or Complexity of Data Reviewed  Clinical lab tests: ordered and reviewed  Tests in the radiology section of CPT®: ordered and reviewed    Risk of Complications, Morbidity, and/or Mortality  General comments: Patient presents to the ED with his daughter with concern for fall with head strike shortly prior to arrival   Was reported it was a strictly a mechanical fall with no symptoms prior to falling  No LOC or amnesia to the event  Patient complains of a small abrasion and mild pain over the left periorbital area otherwise no other musculoskeletal complaints  On exam patient is a well-appearing and pleasant 80-year-old male resting in the stretcher in no acute distress  He does have a small abrasion with mild swelling over the left lateral periorbital area  No other concerning findings on physical exam   Patient is neurologically intact  In light of fall on Eliquis will obtain CT head for further evaluation  Will also obtain CT cervical spine in light of fall with head strike and CT facial bones in light of fall with head strike with tenderness, swelling and abrasion over the left periorbital area  Will check CBC, BMP and PT/APTT in light of possible intracranial abnormality  Will give acetaminophen for reported mild pain  Will update tetanus as pt doesn't known last tetanus and no record found in chart review  Disposition pending labs, imaging and re-evaluation      Patient Progress  Patient progress: stable      Disposition  Final diagnoses:   Fall, initial encounter   Abrasion of face, initial encounter   Anemia   Thrombocytopenia ([facility] [address] )   DM (diabetes mellitus) ([facility] [address] )     Time reflects when diagnosis was documented in both MDM as applicable and the Disposition within this note Time User Action Codes Description Comment    11/4/2022 11:23 PM Mag Caul Add [Q36  SIUB] Fall, initial encounter     11/4/2022 11:27 PM Cirilo Dia Add [S00 81XA] Abrasion of face, initial encounter     11/4/2022 11:28 PM Mag Langleyl Add [D64 9] Anemia     11/4/2022 11:28 PM Cirilo Dia Add [D69 6] Thrombocytopenia ([address] )     11/4/2022 11:28 PM Cirilo Dia Add [E11 9] DM (diabetes mellitus) [facility])       ED Disposition     ED Disposition   Discharge    Condition   Stable    Date/Time   Fri Nov 4, 2022 11:23 PM    Comment   Dina Hutton discharge to home/self care                 Follow-up Information     Follow up With Specialties Details Why [address] [facility] Emergency Department Emergency Medicine Go to  As needed, If symptoms worsen [facility] [id number]  [address] [facility] Emergency Department, [address] [facility] , [place], [place], [address]          Discharge Medication List as of 11/4/2022 11:30 PM      CONTINUE these medications which have NOT CHANGED    Details   apixaban (ELIQUIS) 2 5 mg Take 1 tablet (2 5 mg total) by mouth 2 (two) times a day, Starting Tue 4/30/2019, Normal      atorvastatin (LIPITOR) 40 mg tablet Take 1 tablet (40 mg total) by mouth every evening, Starting Tue 9/8/2020, Print      BD Pen Needle Karen U/F 32G X 4 MM MISC Use as directed, Starting Fri 8/7/2020, Historical Med      BD Veo Insulin Syringe U/F 31G X 15/64" 0 3 ML MISC TO USE WITH INSULIN DAILY, Historical Med      metFORMIN (GLUCOPHAGE) 1000 MG tablet Take 1,000 mg by mouth 2 (two) times a day with meals, Historical Med      metoprolol succinate (TOPROL-XL) 50 mg 24 hr tablet Take 1 tablet (50 mg total) by mouth daily, Starting Tue 4/30/2019, Normal      MULTIPLE VITAMIN PO Take by mouth, Historical Med      Omega-3 Fatty Acids (FISH OIL OMEGA-3 PO) Take by mouth, Historical Med             No discharge procedures on file      PDMP Review     None          ED Provider  Electronically Signed by           Jina Cuevas PA-C  11/05/22 0048

## 2022-11-05 NOTE — DISCHARGE INSTRUCTIONS
Please return to the ED for any concerns as outlined in the AVS or for any other concerns  Please follow-up with your primary care provider in 2 days for re-evaluation and further management  Continue acetaminophen as needed for pain control
